# Patient Record
Sex: FEMALE | Race: WHITE | Employment: UNEMPLOYED | URBAN - NONMETROPOLITAN AREA
[De-identification: names, ages, dates, MRNs, and addresses within clinical notes are randomized per-mention and may not be internally consistent; named-entity substitution may affect disease eponyms.]

---

## 2023-04-12 PROBLEM — F33.1 MODERATE EPISODE OF RECURRENT MAJOR DEPRESSIVE DISORDER (HCC): Status: ACTIVE | Noted: 2023-04-12

## 2023-04-12 PROBLEM — F41.0 GENERALIZED ANXIETY DISORDER WITH PANIC ATTACKS: Status: ACTIVE | Noted: 2023-04-12

## 2023-04-12 PROBLEM — F41.1 GENERALIZED ANXIETY DISORDER WITH PANIC ATTACKS: Status: ACTIVE | Noted: 2023-04-12

## 2023-04-12 PROBLEM — K21.9 GASTROESOPHAGEAL REFLUX DISEASE: Status: ACTIVE | Noted: 2023-04-12

## 2023-05-29 DIAGNOSIS — F41.1 GENERALIZED ANXIETY DISORDER WITH PANIC ATTACKS: ICD-10-CM

## 2023-05-29 DIAGNOSIS — F41.0 GENERALIZED ANXIETY DISORDER WITH PANIC ATTACKS: ICD-10-CM

## 2023-05-29 DIAGNOSIS — F33.1 MODERATE EPISODE OF RECURRENT MAJOR DEPRESSIVE DISORDER (HCC): ICD-10-CM

## 2023-05-30 ENCOUNTER — OFFICE VISIT (OUTPATIENT)
Dept: FAMILY MEDICINE CLINIC | Age: 33
End: 2023-05-30
Payer: COMMERCIAL

## 2023-05-30 VITALS
DIASTOLIC BLOOD PRESSURE: 84 MMHG | WEIGHT: 139.8 LBS | RESPIRATION RATE: 14 BRPM | BODY MASS INDEX: 26.39 KG/M2 | SYSTOLIC BLOOD PRESSURE: 124 MMHG | TEMPERATURE: 98.4 F | HEIGHT: 61 IN | OXYGEN SATURATION: 99 % | HEART RATE: 72 BPM

## 2023-05-30 DIAGNOSIS — F41.0 GENERALIZED ANXIETY DISORDER WITH PANIC ATTACKS: ICD-10-CM

## 2023-05-30 DIAGNOSIS — F41.1 GENERALIZED ANXIETY DISORDER WITH PANIC ATTACKS: ICD-10-CM

## 2023-05-30 DIAGNOSIS — F33.2 SEVERE EPISODE OF RECURRENT MAJOR DEPRESSIVE DISORDER, WITHOUT PSYCHOTIC FEATURES (HCC): Primary | ICD-10-CM

## 2023-05-30 PROCEDURE — G8419 CALC BMI OUT NRM PARAM NOF/U: HCPCS | Performed by: NURSE PRACTITIONER

## 2023-05-30 PROCEDURE — G8427 DOCREV CUR MEDS BY ELIG CLIN: HCPCS | Performed by: NURSE PRACTITIONER

## 2023-05-30 PROCEDURE — 99214 OFFICE O/P EST MOD 30 MIN: CPT | Performed by: NURSE PRACTITIONER

## 2023-05-30 PROCEDURE — 1036F TOBACCO NON-USER: CPT | Performed by: NURSE PRACTITIONER

## 2023-05-30 RX ORDER — ESCITALOPRAM OXALATE 10 MG/1
10 TABLET ORAL DAILY
Qty: 30 TABLET | Refills: 1 | Status: SHIPPED | OUTPATIENT
Start: 2023-05-30

## 2023-05-30 RX ORDER — FLUOXETINE HYDROCHLORIDE 20 MG/1
CAPSULE ORAL
Qty: 30 CAPSULE | Refills: 1 | Status: SHIPPED | OUTPATIENT
Start: 2023-05-30 | End: 2023-05-30

## 2023-05-30 SDOH — ECONOMIC STABILITY: FOOD INSECURITY: WITHIN THE PAST 12 MONTHS, THE FOOD YOU BOUGHT JUST DIDN'T LAST AND YOU DIDN'T HAVE MONEY TO GET MORE.: NEVER TRUE

## 2023-05-30 SDOH — ECONOMIC STABILITY: INCOME INSECURITY: HOW HARD IS IT FOR YOU TO PAY FOR THE VERY BASICS LIKE FOOD, HOUSING, MEDICAL CARE, AND HEATING?: NOT HARD AT ALL

## 2023-05-30 SDOH — ECONOMIC STABILITY: HOUSING INSECURITY
IN THE LAST 12 MONTHS, WAS THERE A TIME WHEN YOU DID NOT HAVE A STEADY PLACE TO SLEEP OR SLEPT IN A SHELTER (INCLUDING NOW)?: NO

## 2023-05-30 SDOH — ECONOMIC STABILITY: FOOD INSECURITY: WITHIN THE PAST 12 MONTHS, YOU WORRIED THAT YOUR FOOD WOULD RUN OUT BEFORE YOU GOT MONEY TO BUY MORE.: NEVER TRUE

## 2023-05-30 NOTE — PROGRESS NOTES
[facility] at / [address] [facility] [address] [address]. [address]  Chief Complaint   Patient presents with    Follow-up     Stopped taking prozac due to giving headaches        History obtained from chart review and the patient. SUBJECTIVE:  Tony Mcghee is a 34 y.o. female that presents today for follow up anxiety/depression    Anxiety     HPI:    Inciting events or triggers for anxiety - kids, being around a lot of people   Frequency of anxiety - daily  Panic attacks? Yes  Symptoms of panic attacks -  feelings of losing control, psychomotor agitation, and sweating  Sleep Disturbances? Yes - staying asleep  Impaired concentration? No  Substance abuse? Some ETOH, maybe 2-3 times/week 2-3 beers at a setting  Suicidal/Homicidal Ideation? Yes - in the past had suicidal thoughts, but no plan or intent. Compliant with meds: yes, Prozac 20 mg  Med side effects: headaches and upset stomach- so she stopped the Prozac    Sees therapist?: No  Family History of Mental Illness? Yes    History of depression as well. She was Cymbalta in the past but it made her gain weight. OBGYN gave her Zoloft and Effexor in the past. Effexor didn't work, and Zoloft messed with her menses. Recently broke up with boyfriend and moods are terrible. Feeling very depressed. Sleeping terribly, can't turn mind off. At first she was struggling with SI after breakup, but not currently. Appetite has also been terrible. Energy and motivation are terrible.     Age/Gender Health Maintenance    Lipid - No results found for: CHOL  No results found for: TRIG  No results found for: HDL  No results found for: LDLCHOLESTEROL, LDLCALC  No results found for: LABVLDL, VLDL  No results found for: CHOLHDLRATIO  DM Screen - No results found for: LABA1C  Colon Cancer Screening - 39  Lung Cancer Screening (Age 54 to [de-identified] with 30 pack year hx, current smoker or quit within past 15 years) - n/a    Tetanus - next due 10/14/26  Influenza Vaccine -

## 2023-06-27 DIAGNOSIS — F41.0 GENERALIZED ANXIETY DISORDER WITH PANIC ATTACKS: ICD-10-CM

## 2023-06-27 DIAGNOSIS — F33.1 MODERATE EPISODE OF RECURRENT MAJOR DEPRESSIVE DISORDER (HCC): ICD-10-CM

## 2023-06-27 DIAGNOSIS — K21.9 GASTROESOPHAGEAL REFLUX DISEASE, UNSPECIFIED WHETHER ESOPHAGITIS PRESENT: ICD-10-CM

## 2023-06-27 DIAGNOSIS — F41.1 GENERALIZED ANXIETY DISORDER WITH PANIC ATTACKS: ICD-10-CM

## 2023-06-27 RX ORDER — OMEPRAZOLE 20 MG/1
CAPSULE, DELAYED RELEASE ORAL
Qty: 60 CAPSULE | Refills: 2 | Status: SHIPPED | OUTPATIENT
Start: 2023-06-27

## 2023-06-27 RX ORDER — TRAZODONE HYDROCHLORIDE 50 MG/1
TABLET ORAL
Qty: 30 TABLET | Refills: 2 | Status: SHIPPED | OUTPATIENT
Start: 2023-06-27

## 2023-07-25 DIAGNOSIS — F41.0 GENERALIZED ANXIETY DISORDER WITH PANIC ATTACKS: ICD-10-CM

## 2023-07-25 DIAGNOSIS — F33.1 MODERATE EPISODE OF RECURRENT MAJOR DEPRESSIVE DISORDER (HCC): ICD-10-CM

## 2023-07-25 DIAGNOSIS — F41.1 GENERALIZED ANXIETY DISORDER WITH PANIC ATTACKS: ICD-10-CM

## 2023-07-25 DIAGNOSIS — F33.2 SEVERE EPISODE OF RECURRENT MAJOR DEPRESSIVE DISORDER, WITHOUT PSYCHOTIC FEATURES (HCC): ICD-10-CM

## 2023-07-25 RX ORDER — ESCITALOPRAM OXALATE 10 MG/1
TABLET ORAL
Qty: 30 TABLET | Refills: 0 | Status: SHIPPED | OUTPATIENT
Start: 2023-07-25 | End: 2023-08-21

## 2023-07-25 RX ORDER — FLUOXETINE HYDROCHLORIDE 20 MG/1
CAPSULE ORAL
Qty: 30 CAPSULE | Refills: 1 | OUTPATIENT
Start: 2023-07-25

## 2023-07-25 NOTE — TELEPHONE ENCOUNTER
Recent Visits  Date Type Provider Dept   05/30/23 Office Visit SHIRA Benavides CNP [facility]   04/12/23 Office Visit SHIRA Benavides CNP [facility]   Showing recent visits within past 540 days with a meds authorizing provider and meeting all other requirements  Future Appointments  Date Type Provider Dept   07/27/23 Appointment SHIRA Benavides CNP [facility]   Showing future appointments within next 150 days with a meds authorizing provider and meeting all other requirements

## 2023-08-21 ENCOUNTER — OFFICE VISIT (OUTPATIENT)
Dept: FAMILY MEDICINE CLINIC | Age: 33
End: 2023-08-21
Payer: COMMERCIAL

## 2023-08-21 VITALS
WEIGHT: 137.8 LBS | HEIGHT: 61 IN | HEART RATE: 92 BPM | DIASTOLIC BLOOD PRESSURE: 80 MMHG | BODY MASS INDEX: 26.01 KG/M2 | TEMPERATURE: 97.3 F | OXYGEN SATURATION: 98 % | RESPIRATION RATE: 16 BRPM | SYSTOLIC BLOOD PRESSURE: 116 MMHG

## 2023-08-21 DIAGNOSIS — F33.42 RECURRENT MAJOR DEPRESSIVE DISORDER, IN FULL REMISSION (HCC): ICD-10-CM

## 2023-08-21 DIAGNOSIS — F41.0 GENERALIZED ANXIETY DISORDER WITH PANIC ATTACKS: ICD-10-CM

## 2023-08-21 DIAGNOSIS — R61 GENERALIZED HYPERHIDROSIS: Primary | ICD-10-CM

## 2023-08-21 DIAGNOSIS — F41.1 GENERALIZED ANXIETY DISORDER WITH PANIC ATTACKS: ICD-10-CM

## 2023-08-21 PROCEDURE — 1036F TOBACCO NON-USER: CPT | Performed by: NURSE PRACTITIONER

## 2023-08-21 PROCEDURE — 99214 OFFICE O/P EST MOD 30 MIN: CPT | Performed by: NURSE PRACTITIONER

## 2023-08-21 PROCEDURE — G8427 DOCREV CUR MEDS BY ELIG CLIN: HCPCS | Performed by: NURSE PRACTITIONER

## 2023-08-21 PROCEDURE — G8419 CALC BMI OUT NRM PARAM NOF/U: HCPCS | Performed by: NURSE PRACTITIONER

## 2023-08-21 RX ORDER — ESCITALOPRAM OXALATE 10 MG/1
15 TABLET ORAL DAILY
Qty: 45 TABLET | Refills: 2 | Status: SHIPPED | OUTPATIENT
Start: 2023-08-21

## 2023-08-21 RX ORDER — GLYCOPYRROLATE 1 MG/1
1 TABLET ORAL 2 TIMES DAILY
Qty: 60 TABLET | Refills: 1 | Status: SHIPPED | OUTPATIENT
Start: 2023-08-21

## 2023-08-21 NOTE — PROGRESS NOTES
[facility] at [address], [address] [place] [address]  Chief Complaint   Patient presents with    Depression     States anxiety medicine is helping, will need another refill. Acid reflux medication is helping. Would like to discuss her excessive sweating. History obtained from chart review and the patient.     SUBJECTIVE:  Juan Benítez is a 34 y.o. female that presents today for follow up anxiety/depression    Anxiety/MDD  She denies any depression  Feels like Lexapro helps with depression  Anxiety is better, still shakes a lot when around people  Denies any side effects  Denies any panic attacks  Would like to maybe bump dose up to help more with anxiety    Hyperhidrosis  C/o generalized hyperhidrosis  Has had for years but gradually getting worse    Age/Gender Health Maintenance    Lipid - No results found for: CHOL  No results found for: TRIG  No results found for: HDL  No results found for: LDLCHOLESTEROL, LDLCALC  No results found for: LABVLDL, VLDL  No results found for: CHOLHDLRATIO  DM Screen - No results found for: LABA1C  Colon Cancer Screening - 39  Lung Cancer Screening (Age 54 to 80 with 30 pack year hx, current smoker or quit within past 15 years) - n/a    Tetanus - next due 10/14/26  Influenza Vaccine - yearly  Pneumonia Vaccine - 65  Zostavax - 50   HPV Vaccine - n/a    Breast Cancer Screening - 50  Cervical Cancer Screening - per OB  Osteoporosis Screening - 65  Chlamydia Screen - n/    AAA Screening - n/a    Falls screening - n/a    Current Outpatient Medications   Medication Sig Dispense Refill    escitalopram (LEXAPRO) 10 MG tablet Take 1.5 tablets by mouth daily 45 tablet 2    glycopyrrolate (ROBINUL) 1 MG tablet Take 1 tablet by mouth 2 times daily 60 tablet 1    omeprazole (PRILOSEC) 20 MG delayed release capsule take 1 capsule by mouth twice a day before meals 60 capsule 2    traZODone (DESYREL) 50 MG tablet take 1/2 to 1 tablet by mouth nightly if needed for sleep

## 2023-09-18 DIAGNOSIS — F41.1 GENERALIZED ANXIETY DISORDER WITH PANIC ATTACKS: ICD-10-CM

## 2023-09-18 DIAGNOSIS — F41.0 GENERALIZED ANXIETY DISORDER WITH PANIC ATTACKS: ICD-10-CM

## 2023-09-18 DIAGNOSIS — F33.1 MODERATE EPISODE OF RECURRENT MAJOR DEPRESSIVE DISORDER (HCC): ICD-10-CM

## 2023-09-18 RX ORDER — TRAZODONE HYDROCHLORIDE 50 MG/1
TABLET ORAL
Qty: 90 TABLET | Refills: 1 | Status: SHIPPED | OUTPATIENT
Start: 2023-09-18

## 2023-09-18 NOTE — TELEPHONE ENCOUNTER
Recent Visits  Date Type Provider Dept   08/21/23 Office Visit SH[facility]A Stanley CNP [facility]   05/30/23 Office Visit SHIRA Stanley [facility]   04/12/23 Office Visit SHIRA Stanley [facility]   Showing recent visits within past 540 days with a meds authorizing provider and meeting all other requirements  Future Appointments  Date Type Provider Dept   10/23/23 Appointment SHIRA Stanley C[facility]   Showing future appointments within next 150 days with a meds authorizing provider and meeting all other requirements

## 2023-09-26 DIAGNOSIS — K21.9 GASTROESOPHAGEAL REFLUX DISEASE, UNSPECIFIED WHETHER ESOPHAGITIS PRESENT: ICD-10-CM

## 2023-09-26 RX ORDER — OMEPRAZOLE 20 MG/1
CAPSULE, DELAYED RELEASE ORAL
Qty: 60 CAPSULE | Refills: 2 | Status: SHIPPED | OUTPATIENT
Start: 2023-09-26

## 2023-09-26 NOTE — TELEPHONE ENCOUNTER
Recent Visits  Date Type Provider Dept   08/21/23 Office Visit [facility]er [facility]   05/30/23 Office Visit SH[facility]A Greer [facility]   04/12/23 Office Visit SH[facility]A Greer [facility]   Showing recent visits within past 540 days with a meds authorizing provider and meeting all other requirements  Future Appointments  Date Type Provider Dept   10/23/23 Appointment SHIRA Greer [facility]   Showing future appointments within next 150 days with a meds authorizing provider and meeting all other requirements

## 2023-10-14 DIAGNOSIS — R61 GENERALIZED HYPERHIDROSIS: ICD-10-CM

## 2023-10-16 RX ORDER — GLYCOPYRROLATE 1 MG/1
1 TABLET ORAL 2 TIMES DAILY
Qty: 60 TABLET | Refills: 1 | Status: SHIPPED | OUTPATIENT
Start: 2023-10-16

## 2023-10-16 NOTE — TELEPHONE ENCOUNTER
Recent Visits  Date Type Provider Dept   08/21/23 Office Visit SH[facility]A Jackson [facility]   05/30/23 Office Visit SHIRA Jackson [facility]   04/12/23 Office Visit SHIRA Jackson [facility]   Showing recent visits within past 540 days with a meds authorizing provider and meeting all other requirements  Future Appointments  Date Type Provider Dept   10/23/23 Appointment SHIRA Jackson [facility]   Showing future appointments within next 150 days with a meds authorizing provider and meeting all other requirements

## 2023-12-05 DIAGNOSIS — F41.0 GENERALIZED ANXIETY DISORDER WITH PANIC ATTACKS: ICD-10-CM

## 2023-12-05 DIAGNOSIS — F33.42 RECURRENT MAJOR DEPRESSIVE DISORDER, IN FULL REMISSION (HCC): ICD-10-CM

## 2023-12-05 DIAGNOSIS — F41.1 GENERALIZED ANXIETY DISORDER WITH PANIC ATTACKS: ICD-10-CM

## 2023-12-05 RX ORDER — ESCITALOPRAM OXALATE 10 MG/1
15 TABLET ORAL DAILY
Qty: 45 TABLET | Refills: 2 | Status: SHIPPED | OUTPATIENT
Start: 2023-12-05

## 2023-12-05 NOTE — TELEPHONE ENCOUNTER
Recent Visits  Date Type Provider Dept   08/21/23 Office Visit Clearance SHIRA Haynes [facility]NP [facility]   05/30/23 Office Visit Clearance SH[facility]A Haynes [facility]   04/12/23 Office Visit Clearance SH[facility]A Haynes [facility]   Showing recent visits within past 540 days with a meds authorizing provider and meeting all other requirements  Future Appointments  Date Type Provider Dept   12/11/23 Appointment Clearance SHIRA Haynes [facility]   Showing future appointments within next 150 days with a meds authorizing provider and meeting all other requirements

## 2023-12-06 DIAGNOSIS — F41.1 GENERALIZED ANXIETY DISORDER WITH PANIC ATTACKS: ICD-10-CM

## 2023-12-06 DIAGNOSIS — F33.42 RECURRENT MAJOR DEPRESSIVE DISORDER, IN FULL REMISSION (HCC): ICD-10-CM

## 2023-12-06 DIAGNOSIS — F41.0 GENERALIZED ANXIETY DISORDER WITH PANIC ATTACKS: ICD-10-CM

## 2023-12-06 RX ORDER — ESCITALOPRAM OXALATE 10 MG/1
10 TABLET ORAL DAILY
Qty: 30 TABLET | OUTPATIENT
Start: 2023-12-06

## 2023-12-09 DIAGNOSIS — R61 GENERALIZED HYPERHIDROSIS: ICD-10-CM

## 2023-12-11 RX ORDER — GLYCOPYRROLATE 1 MG/1
1 TABLET ORAL 2 TIMES DAILY
Qty: 60 TABLET | Refills: 1 | Status: SHIPPED | OUTPATIENT
Start: 2023-12-11

## 2023-12-11 NOTE — TELEPHONE ENCOUNTER
Recent Visits  Date Type Provider Dept   23 Office Visit [facility]A Palomares [facility]   23 Office Visit [facility] Palomares [facility]   23 Office Visit [facility]A Palomares [facility]   Showing recent visits within past 540 days with a meds authorizing provider and meeting all other requirements  Today's Visits  Date Type Provider Dept   23 Appointment SHIRA Palomares [facility]   Showing today's visits with a meds authorizing provider and meeting all other requirements  Future Appointments  No visits were found meeting these conditions.   Showing future appointments within next 150 days with a meds authorizing provider and meeting all other requirements

## 2023-12-12 ENCOUNTER — TELEMEDICINE (OUTPATIENT)
Dept: FAMILY MEDICINE CLINIC | Age: 33
End: 2023-12-12
Payer: COMMERCIAL

## 2023-12-12 DIAGNOSIS — F41.0 GENERALIZED ANXIETY DISORDER WITH PANIC ATTACKS: ICD-10-CM

## 2023-12-12 DIAGNOSIS — R61 GENERALIZED HYPERHIDROSIS: ICD-10-CM

## 2023-12-12 DIAGNOSIS — F33.1 MODERATE EPISODE OF RECURRENT MAJOR DEPRESSIVE DISORDER (HCC): Primary | ICD-10-CM

## 2023-12-12 DIAGNOSIS — F41.1 GENERALIZED ANXIETY DISORDER WITH PANIC ATTACKS: ICD-10-CM

## 2023-12-12 DIAGNOSIS — K21.9 GASTROESOPHAGEAL REFLUX DISEASE, UNSPECIFIED WHETHER ESOPHAGITIS PRESENT: ICD-10-CM

## 2023-12-12 PROCEDURE — G8428 CUR MEDS NOT DOCUMENT: HCPCS | Performed by: NURSE PRACTITIONER

## 2023-12-12 PROCEDURE — G8484 FLU IMMUNIZE NO ADMIN: HCPCS | Performed by: NURSE PRACTITIONER

## 2023-12-12 PROCEDURE — 1036F TOBACCO NON-USER: CPT | Performed by: NURSE PRACTITIONER

## 2023-12-12 PROCEDURE — 99214 OFFICE O/P EST MOD 30 MIN: CPT | Performed by: NURSE PRACTITIONER

## 2023-12-12 PROCEDURE — G8419 CALC BMI OUT NRM PARAM NOF/U: HCPCS | Performed by: NURSE PRACTITIONER

## 2023-12-12 RX ORDER — ESCITALOPRAM OXALATE 20 MG/1
20 TABLET ORAL DAILY
Qty: 30 TABLET | Refills: 1 | Status: SHIPPED | OUTPATIENT
Start: 2023-12-12

## 2023-12-12 ASSESSMENT — ENCOUNTER SYMPTOMS
EYE PAIN: 0
EYE REDNESS: 0
VOMITING: 0
NAUSEA: 1
RHINORRHEA: 0
SORE THROAT: 0
WHEEZING: 0
COUGH: 0
DIARRHEA: 0
TROUBLE SWALLOWING: 0
ABDOMINAL PAIN: 0
COLOR CHANGE: 0
SHORTNESS OF BREATH: 0

## 2023-12-12 NOTE — PROGRESS NOTES
Completed    HIV screen  Completed    Hepatitis A vaccine  Aged Out    HPV vaccine  Aged Out    Meningococcal (ACWY) vaccine  Aged Out    Pneumococcal 0-64 years Vaccine  Aged Out    Depression Screen  Discontinued       PHYSICAL EXAMINATION:  [ INSTRUCTIONS:  \"[x]\" Indicates a positive item  \"[]\" Indicates a negative item  -- DELETE ALL ITEMS NOT EXAMINED]  Vital Signs: (As obtained by patient/caregiver or practitioner observation)    Blood pressure-  Heart rate-    Respiratory rate-    Temperature-  Pulse oximetry-     Constitutional: [x] Appears well-developed and well-nourished [x] No apparent distress      [] Abnormal-   Mental status  [x] Alert and awake  [x] Oriented to person/place/time [x]Able to follow commands      Eyes:  EOM    [x]  Normal  [] Abnormal-  Sclera  [x]  Normal  [] Abnormal -         Discharge [x]  None visible  [] Abnormal -    HENT:   [x] Normocephalic, atraumatic.   [] Abnormal   [x] Mouth/Throat: Mucous membranes are moist.     External Ears [x] Normal  [] Abnormal-     Neck: [x] No visualized mass     Pulmonary/Chest: [x] Respiratory effort normal.  [x] No visualized signs of difficulty breathing or respiratory distress        [] Abnormal-      Musculoskeletal:   [x] Normal gait with no signs of ataxia         [x] Normal range of motion of neck        [] Abnormal-       Neurological:        [x] No Facial Asymmetry (Cranial nerve 7 motor function) (limited exam to video visit)          [x] No gaze palsy        [] Abnormal-         Skin:        [x] No significant exanthematous lesions or discoloration noted on facial skin         [] Abnormal-            Psychiatric:       [x] Normal Affect [x] No Hallucinations        [] Abnormal-     Other pertinent observable physical exam findings-     ASSESSMENT & PLAN  Diagnoses and all orders for this visit:    Moderate episode of recurrent major depressive disorder (HCC)    Generalized anxiety disorder with panic attacks  -     escitalopram

## 2023-12-27 DIAGNOSIS — K21.9 GASTROESOPHAGEAL REFLUX DISEASE, UNSPECIFIED WHETHER ESOPHAGITIS PRESENT: ICD-10-CM

## 2023-12-27 RX ORDER — OMEPRAZOLE 20 MG/1
CAPSULE, DELAYED RELEASE ORAL
Qty: 60 CAPSULE | Refills: 2 | Status: SHIPPED | OUTPATIENT
Start: 2023-12-27

## 2023-12-27 NOTE — TELEPHONE ENCOUNTER
Recent Visits  Date Type Provider Dept   08/21/23 Office Visit [facility]A Slaughter C[facility]   05/30/23 Office Visit SH[facility]A Slaughter [facility]   04/12/23 Office Visit HSIRA Slaughter C[facility]   Showing recent visits within past 540 days with a meds authorizing provider and meeting all other requirements  Future Appointments  Date Type Provider Dept   01/09/24 Appointment SH[facility]A Slaughter [facility]   Showing future appointments within next 150 days with a meds authorizing provider and meeting all other requirements

## 2024-01-24 ENCOUNTER — HOSPITAL ENCOUNTER (INPATIENT)
Age: 34
LOS: 7 days | Discharge: HOME | DRG: 263 | End: 2024-02-02
Attending: EMERGENCY MEDICINE | Admitting: NURSE PRACTITIONER
Payer: COMMERCIAL

## 2024-01-24 ENCOUNTER — APPOINTMENT (OUTPATIENT)
Dept: ULTRASOUND IMAGING | Age: 34
DRG: 263 | End: 2024-01-24
Payer: COMMERCIAL

## 2024-01-24 ENCOUNTER — APPOINTMENT (OUTPATIENT)
Dept: CT IMAGING | Age: 34
DRG: 263 | End: 2024-01-24
Payer: COMMERCIAL

## 2024-01-24 DIAGNOSIS — N39.0 URINARY TRACT INFECTION WITHOUT HEMATURIA, SITE UNSPECIFIED: ICD-10-CM

## 2024-01-24 DIAGNOSIS — K29.20 ACUTE ALCOHOLIC GASTRITIS WITHOUT HEMORRHAGE: ICD-10-CM

## 2024-01-24 DIAGNOSIS — K80.80 BILIARY CALCULUS OF OTHER SITE WITHOUT OBSTRUCTION: ICD-10-CM

## 2024-01-24 DIAGNOSIS — R07.9 CHEST PAIN IN ADULT: ICD-10-CM

## 2024-01-24 DIAGNOSIS — K81.0 ACUTE CHOLECYSTITIS: ICD-10-CM

## 2024-01-24 DIAGNOSIS — Z90.49 S/P LAPAROSCOPIC CHOLECYSTECTOMY: ICD-10-CM

## 2024-01-24 DIAGNOSIS — F10.20 ALCOHOLISM (HCC): Primary | ICD-10-CM

## 2024-01-24 LAB
ALBUMIN SERPL BCG-MCNC: 4.1 G/DL (ref 3.5–5.1)
ALP SERPL-CCNC: 121 U/L (ref 38–126)
ALT SERPL W/O P-5'-P-CCNC: 109 U/L (ref 11–66)
AMPHETAMINES UR QL SCN: NEGATIVE
ANION GAP SERPL CALC-SCNC: 14 MEQ/L (ref 8–16)
AST SERPL-CCNC: 74 U/L (ref 5–40)
B-HCG SERPL QL: NEGATIVE
BACTERIA URNS QL MICRO: ABNORMAL /HPF
BARBITURATES UR QL SCN: NEGATIVE
BASOPHILS ABSOLUTE: 0 THOU/MM3 (ref 0–0.1)
BASOPHILS NFR BLD AUTO: 0.4 %
BENZODIAZ UR QL SCN: NEGATIVE
BILIRUB CONJ SERPL-MCNC: < 0.2 MG/DL (ref 0–0.3)
BILIRUB SERPL-MCNC: 0.2 MG/DL (ref 0.3–1.2)
BILIRUB UR QL STRIP.AUTO: NEGATIVE
BUN SERPL-MCNC: 9 MG/DL (ref 7–22)
BZE UR QL SCN: NEGATIVE
CALCIUM SERPL-MCNC: 8.4 MG/DL (ref 8.5–10.5)
CANNABINOIDS UR QL SCN: NEGATIVE
CASTS #/AREA URNS LPF: ABNORMAL /LPF
CASTS 2: ABNORMAL /LPF
CHARACTER UR: ABNORMAL
CHLORIDE SERPL-SCNC: 101 MEQ/L (ref 98–111)
CO2 SERPL-SCNC: 23 MEQ/L (ref 23–33)
COLOR: YELLOW
CREAT SERPL-MCNC: 0.5 MG/DL (ref 0.4–1.2)
CRYSTALS URNS MICRO: ABNORMAL
D DIMER PPP IA.FEU-MCNC: 623 NG/ML FEU (ref 0–500)
DEPRECATED RDW RBC AUTO: 50.9 FL (ref 35–45)
EKG ATRIAL RATE: 76 BPM
EKG P AXIS: 48 DEGREES
EKG P-R INTERVAL: 130 MS
EKG Q-T INTERVAL: 420 MS
EKG QRS DURATION: 82 MS
EKG QTC CALCULATION (BAZETT): 472 MS
EKG R AXIS: 56 DEGREES
EKG T AXIS: 27 DEGREES
EKG VENTRICULAR RATE: 76 BPM
EOSINOPHIL NFR BLD AUTO: 1.1 %
EOSINOPHILS ABSOLUTE: 0.1 THOU/MM3 (ref 0–0.4)
EPITHELIAL CELLS, UA: ABNORMAL /HPF
ERYTHROCYTE [DISTWIDTH] IN BLOOD BY AUTOMATED COUNT: 14.8 % (ref 11.5–14.5)
ETHANOL SERPL-MCNC: < 0.01 %
FENTANYL: NEGATIVE
FERRITIN SERPL IA-MCNC: 22 NG/ML (ref 10–291)
GFR SERPL CREATININE-BSD FRML MDRD: > 60 ML/MIN/1.73M2
GLUCOSE SERPL-MCNC: 81 MG/DL (ref 70–108)
GLUCOSE UR QL STRIP.AUTO: NEGATIVE MG/DL
HAV IGM SER QL: NEGATIVE
HBV CORE IGM SERPL QL IA: NEGATIVE
HBV SURFACE AG SERPL QL IA: NEGATIVE
HCT VFR BLD AUTO: 34.8 % (ref 37–47)
HCV IGG SERPL QL IA: NEGATIVE
HETEROPH AB SERPL QL IA: NEGATIVE
HGB BLD-MCNC: 11.5 GM/DL (ref 12–16)
HGB UR QL STRIP.AUTO: ABNORMAL
IMM GRANULOCYTES # BLD AUTO: 0.02 THOU/MM3 (ref 0–0.07)
IMM GRANULOCYTES NFR BLD AUTO: 0.4 %
IRON SATN MFR SERPL: 24 % (ref 20–50)
IRON SERPL-MCNC: 66 UG/DL (ref 50–170)
KETONES UR QL STRIP.AUTO: NEGATIVE
LIPASE SERPL-CCNC: 13.2 U/L (ref 5.6–51.3)
LYMPHOCYTES ABSOLUTE: 1.9 THOU/MM3 (ref 1–4.8)
LYMPHOCYTES NFR BLD AUTO: 34.3 %
MAGNESIUM SERPL-MCNC: 2 MG/DL (ref 1.6–2.4)
MCH RBC QN AUTO: 30.7 PG (ref 26–33)
MCHC RBC AUTO-ENTMCNC: 33 GM/DL (ref 32.2–35.5)
MCV RBC AUTO: 93 FL (ref 81–99)
MISCELLANEOUS 2: ABNORMAL
MONOCYTES ABSOLUTE: 0.4 THOU/MM3 (ref 0.4–1.3)
MONOCYTES NFR BLD AUTO: 7.3 %
NEUTROPHILS NFR BLD AUTO: 56.5 %
NITRITE UR QL STRIP: POSITIVE
NRBC BLD AUTO-RTO: 0 /100 WBC
OPIATES UR QL SCN: NEGATIVE
OSMOLALITY SERPL CALC.SUM OF ELEC: 273.4 MOSMOL/KG (ref 275–300)
OXYCODONE: NEGATIVE
PCP UR QL SCN: NEGATIVE
PH UR STRIP.AUTO: 6 [PH] (ref 5–9)
PLATELET # BLD AUTO: 153 THOU/MM3 (ref 130–400)
PMV BLD AUTO: 10.4 FL (ref 9.4–12.4)
POTASSIUM SERPL-SCNC: 3.9 MEQ/L (ref 3.5–5.2)
PROT SERPL-MCNC: 7.1 G/DL (ref 6.1–8)
PROT UR STRIP.AUTO-MCNC: ABNORMAL MG/DL
RBC # BLD AUTO: 3.74 MILL/MM3 (ref 4.2–5.4)
RBC URINE: ABNORMAL /HPF
RENAL EPI CELLS #/AREA URNS HPF: ABNORMAL /[HPF]
SEGMENTED NEUTROPHILS ABSOLUTE COUNT: 3.2 THOU/MM3 (ref 1.8–7.7)
SODIUM SERPL-SCNC: 138 MEQ/L (ref 135–145)
SP GR UR REFRACT.AUTO: 1.02 (ref 1–1.03)
TIBC SERPL-MCNC: 271 UG/DL (ref 171–450)
TROPONIN, HIGH SENSITIVITY: < 6 NG/L (ref 0–12)
TROPONIN, HIGH SENSITIVITY: < 6 NG/L (ref 0–12)
TSH SERPL DL<=0.005 MIU/L-ACNC: 1.29 UIU/ML (ref 0.4–4.2)
UROBILINOGEN, URINE: 0.2 EU/DL (ref 0–1)
WBC # BLD AUTO: 5.6 THOU/MM3 (ref 4.8–10.8)
WBC #/AREA URNS HPF: ABNORMAL /HPF
WBC #/AREA URNS HPF: ABNORMAL /[HPF]
YEAST LIKE FUNGI URNS QL MICRO: ABNORMAL

## 2024-01-24 PROCEDURE — 83690 ASSAY OF LIPASE: CPT

## 2024-01-24 PROCEDURE — 85379 FIBRIN DEGRADATION QUANT: CPT

## 2024-01-24 PROCEDURE — 87086 URINE CULTURE/COLONY COUNT: CPT

## 2024-01-24 PROCEDURE — 99223 1ST HOSP IP/OBS HIGH 75: CPT

## 2024-01-24 PROCEDURE — 82077 ASSAY SPEC XCP UR&BREATH IA: CPT

## 2024-01-24 PROCEDURE — 84484 ASSAY OF TROPONIN QUANT: CPT

## 2024-01-24 PROCEDURE — 83735 ASSAY OF MAGNESIUM: CPT

## 2024-01-24 PROCEDURE — 87077 CULTURE AEROBIC IDENTIFY: CPT

## 2024-01-24 PROCEDURE — 93005 ELECTROCARDIOGRAM TRACING: CPT | Performed by: EMERGENCY MEDICINE

## 2024-01-24 PROCEDURE — 86308 HETEROPHILE ANTIBODY SCREEN: CPT

## 2024-01-24 PROCEDURE — 6370000000 HC RX 637 (ALT 250 FOR IP)

## 2024-01-24 PROCEDURE — 83540 ASSAY OF IRON: CPT

## 2024-01-24 PROCEDURE — 87186 SC STD MICRODIL/AGAR DIL: CPT

## 2024-01-24 PROCEDURE — 82728 ASSAY OF FERRITIN: CPT

## 2024-01-24 PROCEDURE — 71275 CT ANGIOGRAPHY CHEST: CPT

## 2024-01-24 PROCEDURE — 80074 ACUTE HEPATITIS PANEL: CPT

## 2024-01-24 PROCEDURE — 81001 URINALYSIS AUTO W/SCOPE: CPT

## 2024-01-24 PROCEDURE — 36415 COLL VENOUS BLD VENIPUNCTURE: CPT

## 2024-01-24 PROCEDURE — 84703 CHORIONIC GONADOTROPIN ASSAY: CPT

## 2024-01-24 PROCEDURE — 85025 COMPLETE CBC W/AUTO DIFF WBC: CPT

## 2024-01-24 PROCEDURE — 2580000003 HC RX 258: Performed by: EMERGENCY MEDICINE

## 2024-01-24 PROCEDURE — 6360000004 HC RX CONTRAST MEDICATION: Performed by: EMERGENCY MEDICINE

## 2024-01-24 PROCEDURE — 82248 BILIRUBIN DIRECT: CPT

## 2024-01-24 PROCEDURE — 93005 ELECTROCARDIOGRAM TRACING: CPT

## 2024-01-24 PROCEDURE — G0378 HOSPITAL OBSERVATION PER HR: HCPCS

## 2024-01-24 PROCEDURE — 6360000002 HC RX W HCPCS

## 2024-01-24 PROCEDURE — 83550 IRON BINDING TEST: CPT

## 2024-01-24 PROCEDURE — 93010 ELECTROCARDIOGRAM REPORT: CPT | Performed by: INTERNAL MEDICINE

## 2024-01-24 PROCEDURE — 96372 THER/PROPH/DIAG INJ SC/IM: CPT

## 2024-01-24 PROCEDURE — 99285 EMERGENCY DEPT VISIT HI MDM: CPT

## 2024-01-24 PROCEDURE — 96367 TX/PROPH/DG ADDL SEQ IV INF: CPT

## 2024-01-24 PROCEDURE — 2580000003 HC RX 258

## 2024-01-24 PROCEDURE — 6360000002 HC RX W HCPCS: Performed by: EMERGENCY MEDICINE

## 2024-01-24 PROCEDURE — 96365 THER/PROPH/DIAG IV INF INIT: CPT

## 2024-01-24 PROCEDURE — 80307 DRUG TEST PRSMV CHEM ANLYZR: CPT

## 2024-01-24 PROCEDURE — 84443 ASSAY THYROID STIM HORMONE: CPT

## 2024-01-24 PROCEDURE — 76705 ECHO EXAM OF ABDOMEN: CPT

## 2024-01-24 PROCEDURE — 80053 COMPREHEN METABOLIC PANEL: CPT

## 2024-01-24 RX ORDER — ONDANSETRON 2 MG/ML
4 INJECTION INTRAMUSCULAR; INTRAVENOUS EVERY 6 HOURS PRN
Status: DISCONTINUED | OUTPATIENT
Start: 2024-01-24 | End: 2024-02-02 | Stop reason: HOSPADM

## 2024-01-24 RX ORDER — SODIUM CHLORIDE 0.9 % (FLUSH) 0.9 %
10 SYRINGE (ML) INJECTION EVERY 12 HOURS SCHEDULED
Status: DISCONTINUED | OUTPATIENT
Start: 2024-01-24 | End: 2024-01-24 | Stop reason: SDUPTHER

## 2024-01-24 RX ORDER — SODIUM CHLORIDE 0.9 % (FLUSH) 0.9 %
10 SYRINGE (ML) INJECTION PRN
Status: DISCONTINUED | OUTPATIENT
Start: 2024-01-24 | End: 2024-01-24 | Stop reason: SDUPTHER

## 2024-01-24 RX ORDER — PHENOBARBITAL 32.4 MG/1
64.8 TABLET ORAL 2 TIMES DAILY
Status: COMPLETED | OUTPATIENT
Start: 2024-01-25 | End: 2024-01-25

## 2024-01-24 RX ORDER — ONDANSETRON 4 MG/1
4 TABLET, ORALLY DISINTEGRATING ORAL EVERY 8 HOURS PRN
Status: DISCONTINUED | OUTPATIENT
Start: 2024-01-24 | End: 2024-02-02 | Stop reason: HOSPADM

## 2024-01-24 RX ORDER — ESCITALOPRAM OXALATE 20 MG/1
20 TABLET ORAL DAILY
Status: DISCONTINUED | OUTPATIENT
Start: 2024-01-24 | End: 2024-02-02 | Stop reason: HOSPADM

## 2024-01-24 RX ORDER — SODIUM CHLORIDE 9 MG/ML
INJECTION, SOLUTION INTRAVENOUS PRN
Status: DISCONTINUED | OUTPATIENT
Start: 2024-01-24 | End: 2024-02-02 | Stop reason: HOSPADM

## 2024-01-24 RX ORDER — SODIUM CHLORIDE 9 MG/ML
INJECTION, SOLUTION INTRAVENOUS CONTINUOUS
Status: DISCONTINUED | OUTPATIENT
Start: 2024-01-24 | End: 2024-01-24

## 2024-01-24 RX ORDER — ACETAMINOPHEN 325 MG/1
650 TABLET ORAL EVERY 6 HOURS PRN
Status: DISCONTINUED | OUTPATIENT
Start: 2024-01-24 | End: 2024-01-30 | Stop reason: SDUPTHER

## 2024-01-24 RX ORDER — SODIUM CHLORIDE 9 MG/ML
INJECTION, SOLUTION INTRAVENOUS CONTINUOUS
Status: ACTIVE | OUTPATIENT
Start: 2024-01-24 | End: 2024-01-25

## 2024-01-24 RX ORDER — PHENOBARBITAL 32.4 MG/1
32.4 TABLET ORAL 2 TIMES DAILY
Status: DISPENSED | OUTPATIENT
Start: 2024-01-26 | End: 2024-01-27

## 2024-01-24 RX ORDER — FOLIC ACID 1 MG/1
1 TABLET ORAL DAILY
Status: DISCONTINUED | OUTPATIENT
Start: 2024-01-24 | End: 2024-02-02 | Stop reason: HOSPADM

## 2024-01-24 RX ORDER — ACETAMINOPHEN 650 MG/1
650 SUPPOSITORY RECTAL EVERY 6 HOURS PRN
Status: DISCONTINUED | OUTPATIENT
Start: 2024-01-24 | End: 2024-02-02 | Stop reason: HOSPADM

## 2024-01-24 RX ORDER — LANOLIN ALCOHOL/MO/W.PET/CERES
100 CREAM (GRAM) TOPICAL DAILY
Status: DISCONTINUED | OUTPATIENT
Start: 2024-01-24 | End: 2024-02-02 | Stop reason: HOSPADM

## 2024-01-24 RX ORDER — MULTIVITAMIN WITH IRON
1 TABLET ORAL DAILY
Status: DISCONTINUED | OUTPATIENT
Start: 2024-01-24 | End: 2024-02-02 | Stop reason: HOSPADM

## 2024-01-24 RX ORDER — SODIUM CHLORIDE 0.9 % (FLUSH) 0.9 %
5-40 SYRINGE (ML) INJECTION PRN
Status: DISCONTINUED | OUTPATIENT
Start: 2024-01-24 | End: 2024-02-02 | Stop reason: HOSPADM

## 2024-01-24 RX ORDER — TRAZODONE HYDROCHLORIDE 50 MG/1
50 TABLET ORAL NIGHTLY PRN
Status: DISCONTINUED | OUTPATIENT
Start: 2024-01-24 | End: 2024-02-02 | Stop reason: HOSPADM

## 2024-01-24 RX ORDER — POTASSIUM CHLORIDE 7.45 MG/ML
10 INJECTION INTRAVENOUS PRN
Status: DISCONTINUED | OUTPATIENT
Start: 2024-01-24 | End: 2024-02-02 | Stop reason: HOSPADM

## 2024-01-24 RX ORDER — MAGNESIUM SULFATE IN WATER 40 MG/ML
2000 INJECTION, SOLUTION INTRAVENOUS PRN
Status: DISCONTINUED | OUTPATIENT
Start: 2024-01-24 | End: 2024-02-02 | Stop reason: HOSPADM

## 2024-01-24 RX ORDER — SODIUM CHLORIDE 0.9 % (FLUSH) 0.9 %
5-40 SYRINGE (ML) INJECTION EVERY 12 HOURS SCHEDULED
Status: DISCONTINUED | OUTPATIENT
Start: 2024-01-24 | End: 2024-02-02 | Stop reason: HOSPADM

## 2024-01-24 RX ORDER — PANTOPRAZOLE SODIUM 40 MG/1
40 TABLET, DELAYED RELEASE ORAL
Status: DISCONTINUED | OUTPATIENT
Start: 2024-01-25 | End: 2024-02-02 | Stop reason: HOSPADM

## 2024-01-24 RX ORDER — ACETAMINOPHEN 325 MG/1
650 TABLET ORAL EVERY 6 HOURS PRN
COMMUNITY

## 2024-01-24 RX ORDER — POTASSIUM CHLORIDE 20 MEQ/1
40 TABLET, EXTENDED RELEASE ORAL PRN
Status: DISCONTINUED | OUTPATIENT
Start: 2024-01-24 | End: 2024-02-02 | Stop reason: HOSPADM

## 2024-01-24 RX ORDER — POLYETHYLENE GLYCOL 3350 17 G/17G
17 POWDER, FOR SOLUTION ORAL DAILY PRN
Status: DISCONTINUED | OUTPATIENT
Start: 2024-01-24 | End: 2024-02-02 | Stop reason: HOSPADM

## 2024-01-24 RX ORDER — ENOXAPARIN SODIUM 100 MG/ML
40 INJECTION SUBCUTANEOUS EVERY 24 HOURS
Status: DISCONTINUED | OUTPATIENT
Start: 2024-01-24 | End: 2024-02-02 | Stop reason: HOSPADM

## 2024-01-24 RX ORDER — SODIUM CHLORIDE 9 MG/ML
INJECTION, SOLUTION INTRAVENOUS PRN
Status: DISCONTINUED | OUTPATIENT
Start: 2024-01-24 | End: 2024-01-24 | Stop reason: SDUPTHER

## 2024-01-24 RX ADMIN — ACETAMINOPHEN 650 MG: 325 TABLET ORAL at 20:31

## 2024-01-24 RX ADMIN — PHENOBARBITAL SODIUM 191.1 MG: 65 INJECTION INTRAMUSCULAR at 23:09

## 2024-01-24 RX ADMIN — Medication 1 TABLET: at 23:05

## 2024-01-24 RX ADMIN — TRAZODONE HYDROCHLORIDE 50 MG: 50 TABLET ORAL at 23:04

## 2024-01-24 RX ADMIN — SODIUM CHLORIDE: 9 INJECTION, SOLUTION INTRAVENOUS at 14:47

## 2024-01-24 RX ADMIN — FOLIC ACID 1 MG: 1 TABLET ORAL at 23:05

## 2024-01-24 RX ADMIN — Medication 100 MG: at 23:05

## 2024-01-24 RX ADMIN — ENOXAPARIN SODIUM 40 MG: 100 INJECTION SUBCUTANEOUS at 23:05

## 2024-01-24 RX ADMIN — CEFTRIAXONE SODIUM 1000 MG: 1 INJECTION, POWDER, FOR SOLUTION INTRAMUSCULAR; INTRAVENOUS at 16:14

## 2024-01-24 RX ADMIN — SODIUM CHLORIDE: 9 INJECTION, SOLUTION INTRAVENOUS at 21:41

## 2024-01-24 RX ADMIN — IOPAMIDOL 80 ML: 755 INJECTION, SOLUTION INTRAVENOUS at 14:40

## 2024-01-24 RX ADMIN — SODIUM CHLORIDE, PRESERVATIVE FREE 10 ML: 5 INJECTION INTRAVENOUS at 21:40

## 2024-01-24 RX ADMIN — ESCITALOPRAM OXALATE 20 MG: 20 TABLET ORAL at 23:05

## 2024-01-24 ASSESSMENT — PAIN DESCRIPTION - ORIENTATION
ORIENTATION: MID;LEFT
ORIENTATION: LEFT
ORIENTATION: LEFT

## 2024-01-24 ASSESSMENT — PAIN - FUNCTIONAL ASSESSMENT
PAIN_FUNCTIONAL_ASSESSMENT: 0-10
PAIN_FUNCTIONAL_ASSESSMENT: 0-10
PAIN_FUNCTIONAL_ASSESSMENT: NONE - DENIES PAIN

## 2024-01-24 ASSESSMENT — PAIN DESCRIPTION - LOCATION
LOCATION: CHEST
LOCATION: CHEST
LOCATION: CHEST;SHOULDER
LOCATION: CHEST

## 2024-01-24 ASSESSMENT — PAIN SCALES - GENERAL
PAINLEVEL_OUTOF10: 6
PAINLEVEL_OUTOF10: 7
PAINLEVEL_OUTOF10: 8
PAINLEVEL_OUTOF10: 3

## 2024-01-24 ASSESSMENT — PAIN DESCRIPTION - DESCRIPTORS
DESCRIPTORS: SHARP
DESCRIPTORS: PRESSURE
DESCRIPTORS: ACHING;DULL;SORE

## 2024-01-24 NOTE — ED TRIAGE NOTES
Patient to ED through triage c/o chest pain. Patient states the pain started last night and continued to today. States the pain is like a pressure on her chest and goes into the left shoulder and arm. Rates pain 8/10. EKG completed. IV access established. Labs collected.

## 2024-01-24 NOTE — ED NOTES
ED to inpatient nurses report      Chief Complaint:  Chief Complaint   Patient presents with    Chest Pain     Present to ED from: home    MOA:     LOC: alert and orientated to name, place, date  Mobility: Independent  Oxygen Baseline: RA    Current needs required: RA     Code Status:   Prior    What abnormal results were found and what did you give/do to treat them? Antibiotics  Any procedures or intervention occur? N/A    Mental Status:  Level of Consciousness: Alert (0)    Psych Assessment:        Vitals:  Patient Vitals for the past 24 hrs:   BP Temp Temp src Pulse Resp SpO2 Height Weight   01/24/24 1451 118/89 -- -- 73 21 98 % -- --   01/24/24 1414 -- -- -- 68 23 98 % -- --   01/24/24 1247 130/89 98.2 °F (36.8 °C) Oral 78 20 100 % 1.549 m (5' 1\") 58.1 kg (128 lb)        LDAs:   Peripheral IV 01/24/24 Left Antecubital (Active)   Site Assessment Clean, dry & intact 01/24/24 1312   Line Status Brisk blood return;Flushed;Normal saline locked;Specimen collected 01/24/24 1312   Phlebitis Assessment No symptoms 01/24/24 1312   Infiltration Assessment 0 01/24/24 1312   Dressing Status New dressing applied;Clean, dry & intact 01/24/24 1312   Dressing Type Transparent 01/24/24 1312   Dressing Intervention New 01/24/24 1312       Ambulatory Status:  Presents to emergency department  because of falls (Syncope, seizure, or loss of consciousness): No, Age > 70: No, Altered Mental Status, Intoxication with alcohol or substance confusion (Disorientation, impaired judgment, poor safety awaremess, or inability to follow instructions): No, Impaired Mobility: Ambulates or transfers with assistive devices or assistance; Unable to ambulate or transer.: No, Nursing Judgement: No    Diagnosis:  DISPOSITION Decision To Admit 01/24/2024 04:10:09 PM   Final diagnoses:   Alcoholism (HCC)   Biliary calculus of other site without obstruction   Acute alcoholic gastritis without hemorrhage   Urinary tract infection without hematuria, site

## 2024-01-24 NOTE — ED PROVIDER NOTES
[facility] EMERGENCY DEPT      EMERGENCY MEDICINE     Room # 37/037A    Pt Name: Nancy Bruno  MRN: [id number]  Birthdate 1990  Date of evaluation: 2024  Provider: Umer Espinoza MD    CHIEF COMPLAINT       Chief Complaint   Patient presents with    Chest Pain     HISTORY OF PRESENT ILLNESS   Nancy Bruno is a pleasant 33 y.o. female who presents to the emergency department from from home, as a walk in to the ED lobby for evaluation of chest pain since last night.  Patient said that she is not doing anything when this happened feels like someone sitting on the chest going to the shoulder with numbness in both arms this lasted for 3 minutes.  Patient admits also that she had short of breath at the time.  Today the patient woke up with tingliness on the hands and numbness in the shoulder with chest pain.  Patient was concerned as she had a significant family history of heart disease.  2 to 3 days ago the patient had been having nausea vomiting yesterday vomited 3-4 times and none today.  Denies any flu symptoms no cough cold, no fever no chills no body malaise..Admit she is alcoholic drink 3 large beers daily and goes into widrawal when she stop drinking, shaking achy nausea vomiting. Last drink was last night beers and vodka    PASTMEDICAL HISTORY     Past Medical History:   Diagnosis Date    Abnormal Pap smear of cervix     LSIL, pt never returned for colp    Anemia     during and outside of pregnancies    Chlamydia     Depression     was taking medication       Patient Active Problem List   Diagnosis Code    Declines  (vaginal birth after ) trial O34.219    Moderate episode of recurrent major depressive disorder (HCC) F33.1    Generalized anxiety disorder with panic attacks F41.1, F41.0    Gastroesophageal reflux disease K21.9    Generalized hyperhidrosis R61     SURGICAL HISTORY       Past Surgical History:   Procedure Laterality Date     SECTION      x3

## 2024-01-24 NOTE — H&P
Musculoskeletal:  Negative for arthralgias and gait problem.   Skin:  Negative for color change.   Neurological:  Positive for dizziness and tremors. Negative for weakness and light-headedness.   Psychiatric/Behavioral:  Negative for agitation, confusion and hallucinations. The patient is nervous/anxious.         PHYSICAL EXAM:  /74   Pulse 66   Temp 98.2 °F (36.8 °C) (Oral)   Resp 18   Ht 1.549 m (5' 1\")   Wt 58.1 kg (128 lb)   SpO2 96%   BMI 24.19 kg/m²   General appearance: Acutely ill appearing. No apparent distress. Appears stated age and cooperative.  Skin: Slightly diaphoretic. Skin color, texture, turgor normal.  No rashes or lesions.  HEENT: Normal cephalic, atraumatic without obvious deformity. Pupils equal, round, and reactive to light.  Extra-ocular muscles intact. Conjunctivae/corneas clear.  Neck: Trachea midline. Supple, with full range of motion.  Cardiovascular: Regular rate and rhythm with normal S1/S2. No murmurs, rubs or gallops.  Respiratory:  Normal respiratory effort. Clear to auscultation, bilaterally without rales, wheezes, or rhonchi.  Abdomen: Soft, non-tender, non-distended. Normal bowel sounds. Negative Miranda's sign.  Musculoskeletal:  No weakness or instability noted.  No edema, erythema, or gross deformity noted.   Vascular: Capillary refill brisk,< 3 seconds. Pulses +2 palpable, equal bilaterally.  Neurologic: Mild tremor noted in upper extremities. Neurovascularly intact without any focal sensory/motor deficits. Cranial nerves: II-XII grossly intact.   Psychiatric: Anxious. Alert and oriented, thought content appropriate, normal insight      Labs:   Recent Labs     01/24/24  1300   WBC 5.6   HGB 11.5*   HCT 34.8*        Recent Labs     01/24/24  1300      K 3.9      CO2 23   BUN 9   CREATININE 0.5   CALCIUM 8.4*     Recent Labs     01/24/24  1300   AST 74*   *   BILIDIR <0.2   BILITOT 0.2*   ALKPHOS 121     No results for input(s): \"INR\" in  the last 72 hours.  No results for input(s): \"CKTOTAL\", \"TROPONINI\" in the last 72 hours.    Urinalysis:    Lab Results   Component Value Date/Time    NITRU POSITIVE 01/24/2024 02:00 PM    WBCUA  01/24/2024 02:00 PM    BACTERIA MANY 01/24/2024 02:00 PM    RBCUA NONE 01/24/2024 02:00 PM    BLOODU TRACE 01/24/2024 02:00 PM    GLUCOSEU NEGATIVE 01/24/2024 02:00 PM       Radiology:   US GALLBLADDER RUQ   Final Result   1. Cholelithiasis without evidence of acute cholecystitis.   2. Hyperechoic structure in the right hepatic lobe, likely a hemangioma.   3. Probable left hepatic cyst.      Final report electronically signed by Dr. Finesse Chavira on 1/24/2024 3:32 PM      CTA CHEST W WO CONTRAST   Final Result   1. No pulmonary embolism.   2. No acute intrathoracic findings.      Final report electronically signed by Dr. Finesse Chavira on 1/24/2024 3:20 PM        US GALLBLADDER RUQ    Result Date: 1/24/2024  PROCEDURE: US GALLBLADDER RUQ CLINICAL INFORMATION: Elevated liver function tests TECHNIQUE: Ultrasound of the right upper quadrant was performed. Grayscale and color images were obtained. COMPARISON: None FINDINGS: There are poorly defined calcifications in the gallbladder. There is no gallbladder wall thickening or pericholecystic fluid. The common bile duct is normal, measuring 0.3 cm. There is no intrahepatic biliary ductal dilation. An avascular anechoic structure in the left hepatic lobe measures 0.5 cm. A hyperechoic structure in the right hepatic lobe measures 1.0 cm.     1. Cholelithiasis without evidence of acute cholecystitis. 2. Hyperechoic structure in the right hepatic lobe, likely a hemangioma. 3. Probable left hepatic cyst. Final report electronically signed by Dr. Finesse Chavira on 1/24/2024 3:32 PM    CTA CHEST W WO CONTRAST    Result Date: 1/24/2024  PROCEDURE: CTA CHEST W WO CONTRAST CLINICAL INFORMATION: Chest pain TECHNIQUE: CTA of the chest was performed following administration of 80 mL

## 2024-01-24 NOTE — ED NOTES
Patient returned from bathroom. Urine sample collected. Patient returned to bed. Respirations easy and unlabored. Call light in reach. Patient has no further requests at this time.

## 2024-01-24 NOTE — ED NOTES
IV fluids infusing. Patient updated on POC. Respirations easy and unlabored. No distress noted. Pt expressed no needs at this time. Call light in reach. Family at bedside.

## 2024-01-25 ENCOUNTER — APPOINTMENT (OUTPATIENT)
Age: 34
DRG: 263 | End: 2024-01-25
Payer: COMMERCIAL

## 2024-01-25 LAB
ALBUMIN SERPL BCG-MCNC: 3.3 G/DL (ref 3.5–5.1)
ALP SERPL-CCNC: 106 U/L (ref 38–126)
ALT SERPL W/O P-5'-P-CCNC: 67 U/L (ref 11–66)
ANION GAP SERPL CALC-SCNC: 15 MEQ/L (ref 8–16)
AST SERPL-CCNC: 33 U/L (ref 5–40)
BASOPHILS ABSOLUTE: 0 THOU/MM3 (ref 0–0.1)
BASOPHILS NFR BLD AUTO: 0.3 %
BILIRUB SERPL-MCNC: 0.3 MG/DL (ref 0.3–1.2)
BUN SERPL-MCNC: 8 MG/DL (ref 7–22)
CALCIUM SERPL-MCNC: 8.4 MG/DL (ref 8.5–10.5)
CHLORIDE SERPL-SCNC: 105 MEQ/L (ref 98–111)
CHOLEST SERPL-MCNC: 108 MG/DL (ref 100–199)
CO2 SERPL-SCNC: 21 MEQ/L (ref 23–33)
CREAT SERPL-MCNC: 0.6 MG/DL (ref 0.4–1.2)
DEPRECATED MEAN GLUCOSE BLD GHB EST-ACNC: 87 MG/DL (ref 70–126)
DEPRECATED RDW RBC AUTO: 49.8 FL (ref 35–45)
ECHO AV CUSP MM: 1.8 CM
ECHO AV PEAK GRADIENT: 9 MMHG
ECHO AV PEAK VELOCITY: 1.5 M/S
ECHO AV VELOCITY RATIO: 0.8
ECHO BSA: 1.57 M2
ECHO LA AREA 2C: 11.9 CM2
ECHO LA AREA 4C: 10.8 CM2
ECHO LA DIAMETER INDEX: 2.26 CM/M2
ECHO LA DIAMETER: 3.6 CM
ECHO LA MAJOR AXIS: 4.2 CM
ECHO LA MINOR AXIS: 4.3 CM
ECHO LA VOL BP: 25 ML (ref 22–52)
ECHO LA VOL MOD A2C: 27 ML (ref 22–52)
ECHO LA VOL MOD A4C: 22 ML (ref 22–52)
ECHO LA VOL/BSA BIPLANE: 16 ML/M2 (ref 16–34)
ECHO LA VOLUME INDEX MOD A2C: 17 ML/M2 (ref 16–34)
ECHO LA VOLUME INDEX MOD A4C: 14 ML/M2 (ref 16–34)
ECHO LV E' LATERAL VELOCITY: 17 CM/S
ECHO LV E' SEPTAL VELOCITY: 11 CM/S
ECHO LV FRACTIONAL SHORTENING: 43 % (ref 28–44)
ECHO LV INTERNAL DIMENSION DIASTOLE INDEX: 2.77 CM/M2
ECHO LV INTERNAL DIMENSION DIASTOLIC: 4.4 CM (ref 3.9–5.3)
ECHO LV INTERNAL DIMENSION SYSTOLIC INDEX: 1.57 CM/M2
ECHO LV INTERNAL DIMENSION SYSTOLIC: 2.5 CM
ECHO LV IVSD: 0.7 CM (ref 0.6–0.9)
ECHO LV MASS 2D: 92.1 G (ref 67–162)
ECHO LV MASS INDEX 2D: 57.9 G/M2 (ref 43–95)
ECHO LV POSTERIOR WALL DIASTOLIC: 0.7 CM (ref 0.6–0.9)
ECHO LV RELATIVE WALL THICKNESS RATIO: 0.32
ECHO LVOT PEAK GRADIENT: 6 MMHG
ECHO LVOT PEAK VELOCITY: 1.2 M/S
ECHO MV A VELOCITY: 0.52 M/S
ECHO MV E DECELERATION TIME (DT): 243 MS
ECHO MV E VELOCITY: 0.93 M/S
ECHO MV E/A RATIO: 1.79
ECHO MV E/E' LATERAL: 5.47
ECHO MV E/E' RATIO (AVERAGED): 6.96
ECHO MV REGURGITANT PEAK GRADIENT: 38 MMHG
ECHO MV REGURGITANT PEAK VELOCITY: 3.1 M/S
ECHO PULMONARY ARTERY END DIASTOLIC PRESSURE: 5 MMHG
ECHO PV MAX VELOCITY: 0.8 M/S
ECHO PV PEAK GRADIENT: 2 MMHG
ECHO PV REGURGITANT MAX VELOCITY: 1.1 M/S
ECHO RV INTERNAL DIMENSION: 2.7 CM
ECHO TV E WAVE: 0.5 M/S
EKG ATRIAL RATE: 69 BPM
EKG P AXIS: 71 DEGREES
EKG P-R INTERVAL: 140 MS
EKG Q-T INTERVAL: 430 MS
EKG QRS DURATION: 78 MS
EKG QTC CALCULATION (BAZETT): 460 MS
EKG R AXIS: 87 DEGREES
EKG T AXIS: 73 DEGREES
EKG VENTRICULAR RATE: 69 BPM
EOSINOPHIL NFR BLD AUTO: 1.9 %
EOSINOPHILS ABSOLUTE: 0.1 THOU/MM3 (ref 0–0.4)
ERYTHROCYTE [DISTWIDTH] IN BLOOD BY AUTOMATED COUNT: 14.4 % (ref 11.5–14.5)
GFR SERPL CREATININE-BSD FRML MDRD: > 60 ML/MIN/1.73M2
GLUCOSE SERPL-MCNC: 71 MG/DL (ref 70–108)
HBA1C MFR BLD HPLC: 4.9 % (ref 4.4–6.4)
HCT VFR BLD AUTO: 34.4 % (ref 37–47)
HDLC SERPL-MCNC: 36 MG/DL
HGB BLD-MCNC: 11 GM/DL (ref 12–16)
IMM GRANULOCYTES # BLD AUTO: 0.01 THOU/MM3 (ref 0–0.07)
IMM GRANULOCYTES NFR BLD AUTO: 0.3 %
LDLC SERPL CALC-MCNC: 47 MG/DL
LYMPHOCYTES ABSOLUTE: 1.7 THOU/MM3 (ref 1–4.8)
LYMPHOCYTES NFR BLD AUTO: 45.3 %
MCH RBC QN AUTO: 30.1 PG (ref 26–33)
MCHC RBC AUTO-ENTMCNC: 32 GM/DL (ref 32.2–35.5)
MCV RBC AUTO: 94.2 FL (ref 81–99)
MONOCYTES ABSOLUTE: 0.3 THOU/MM3 (ref 0.4–1.3)
MONOCYTES NFR BLD AUTO: 7 %
NEUTROPHILS NFR BLD AUTO: 45.2 %
NRBC BLD AUTO-RTO: 0 /100 WBC
OSMOLALITY SERPL CALC.SUM OF ELEC: 278.1 MOSMOL/KG (ref 275–300)
PLATELET # BLD AUTO: 158 THOU/MM3 (ref 130–400)
PMV BLD AUTO: 10.7 FL (ref 9.4–12.4)
POTASSIUM SERPL-SCNC: 4.6 MEQ/L (ref 3.5–5.2)
PROT SERPL-MCNC: 5.9 G/DL (ref 6.1–8)
RBC # BLD AUTO: 3.65 MILL/MM3 (ref 4.2–5.4)
SEGMENTED NEUTROPHILS ABSOLUTE COUNT: 1.7 THOU/MM3 (ref 1.8–7.7)
SODIUM SERPL-SCNC: 141 MEQ/L (ref 135–145)
TRIGL SERPL-MCNC: 123 MG/DL (ref 0–199)
WBC # BLD AUTO: 3.7 THOU/MM3 (ref 4.8–10.8)

## 2024-01-25 PROCEDURE — 93306 TTE W/DOPPLER COMPLETE: CPT

## 2024-01-25 PROCEDURE — 6370000000 HC RX 637 (ALT 250 FOR IP)

## 2024-01-25 PROCEDURE — 93005 ELECTROCARDIOGRAM TRACING: CPT | Performed by: NURSE PRACTITIONER

## 2024-01-25 PROCEDURE — G0378 HOSPITAL OBSERVATION PER HR: HCPCS

## 2024-01-25 PROCEDURE — 99232 SBSQ HOSP IP/OBS MODERATE 35: CPT | Performed by: NURSE PRACTITIONER

## 2024-01-25 PROCEDURE — 93306 TTE W/DOPPLER COMPLETE: CPT | Performed by: INTERNAL MEDICINE

## 2024-01-25 PROCEDURE — 96366 THER/PROPH/DIAG IV INF ADDON: CPT

## 2024-01-25 PROCEDURE — 83036 HEMOGLOBIN GLYCOSYLATED A1C: CPT

## 2024-01-25 PROCEDURE — 93010 ELECTROCARDIOGRAM REPORT: CPT | Performed by: INTERNAL MEDICINE

## 2024-01-25 PROCEDURE — 80061 LIPID PANEL: CPT

## 2024-01-25 PROCEDURE — 85025 COMPLETE CBC W/AUTO DIFF WBC: CPT

## 2024-01-25 PROCEDURE — 96372 THER/PROPH/DIAG INJ SC/IM: CPT

## 2024-01-25 PROCEDURE — 6360000002 HC RX W HCPCS

## 2024-01-25 PROCEDURE — 6360000002 HC RX W HCPCS: Performed by: NURSE PRACTITIONER

## 2024-01-25 PROCEDURE — 2580000003 HC RX 258

## 2024-01-25 PROCEDURE — 36415 COLL VENOUS BLD VENIPUNCTURE: CPT

## 2024-01-25 PROCEDURE — 96375 TX/PRO/DX INJ NEW DRUG ADDON: CPT

## 2024-01-25 PROCEDURE — 80053 COMPREHEN METABOLIC PANEL: CPT

## 2024-01-25 RX ORDER — KETOROLAC TROMETHAMINE 30 MG/ML
30 INJECTION, SOLUTION INTRAMUSCULAR; INTRAVENOUS ONCE
Status: COMPLETED | OUTPATIENT
Start: 2024-01-25 | End: 2024-01-25

## 2024-01-25 RX ORDER — HYDROXYZINE PAMOATE 25 MG/1
25 CAPSULE ORAL 3 TIMES DAILY PRN
Status: DISCONTINUED | OUTPATIENT
Start: 2024-01-25 | End: 2024-02-02 | Stop reason: HOSPADM

## 2024-01-25 RX ADMIN — PHENOBARBITAL SODIUM 191.1 MG: 65 INJECTION INTRAMUSCULAR at 05:53

## 2024-01-25 RX ADMIN — PHENOBARBITAL SODIUM 191.1 MG: 65 INJECTION INTRAMUSCULAR at 03:09

## 2024-01-25 RX ADMIN — ESCITALOPRAM OXALATE 20 MG: 20 TABLET ORAL at 08:15

## 2024-01-25 RX ADMIN — PHENOBARBITAL 64.8 MG: 32.4 TABLET ORAL at 20:07

## 2024-01-25 RX ADMIN — SODIUM CHLORIDE, PRESERVATIVE FREE 10 ML: 5 INJECTION INTRAVENOUS at 20:07

## 2024-01-25 RX ADMIN — ENOXAPARIN SODIUM 40 MG: 100 INJECTION SUBCUTANEOUS at 20:07

## 2024-01-25 RX ADMIN — FOLIC ACID 1 MG: 1 TABLET ORAL at 08:15

## 2024-01-25 RX ADMIN — Medication 1 TABLET: at 08:15

## 2024-01-25 RX ADMIN — KETOROLAC TROMETHAMINE 30 MG: 30 INJECTION, SOLUTION INTRAMUSCULAR; INTRAVENOUS at 13:13

## 2024-01-25 RX ADMIN — PANTOPRAZOLE SODIUM 40 MG: 40 TABLET, DELAYED RELEASE ORAL at 05:51

## 2024-01-25 RX ADMIN — ACETAMINOPHEN 650 MG: 325 TABLET ORAL at 11:29

## 2024-01-25 RX ADMIN — PHENOBARBITAL 64.8 MG: 32.4 TABLET ORAL at 08:17

## 2024-01-25 RX ADMIN — Medication 100 MG: at 08:15

## 2024-01-25 RX ADMIN — CEFTRIAXONE SODIUM 1000 MG: 1 INJECTION, POWDER, FOR SOLUTION INTRAMUSCULAR; INTRAVENOUS at 16:12

## 2024-01-25 RX ADMIN — PANTOPRAZOLE SODIUM 40 MG: 40 TABLET, DELAYED RELEASE ORAL at 16:07

## 2024-01-25 ASSESSMENT — PAIN SCALES - GENERAL
PAINLEVEL_OUTOF10: 2
PAINLEVEL_OUTOF10: 7
PAINLEVEL_OUTOF10: 7
PAINLEVEL_OUTOF10: 5
PAINLEVEL_OUTOF10: 6
PAINLEVEL_OUTOF10: 3

## 2024-01-25 ASSESSMENT — PATIENT HEALTH QUESTIONNAIRE - PHQ9
SUM OF ALL RESPONSES TO PHQ QUESTIONS 1-9: 14
9. THOUGHTS THAT YOU WOULD BE BETTER OFF DEAD, OR OF HURTING YOURSELF: 0
8. MOVING OR SPEAKING SO SLOWLY THAT OTHER PEOPLE COULD HAVE NOTICED. OR THE OPPOSITE, BEING SO FIGETY OR RESTLESS THAT YOU HAVE BEEN MOVING AROUND A LOT MORE THAN USUAL: 2
1. LITTLE INTEREST OR PLEASURE IN DOING THINGS: 2
4. FEELING TIRED OR HAVING LITTLE ENERGY: 2
SUM OF ALL RESPONSES TO PHQ9 QUESTIONS 1 & 2: 3
SUM OF ALL RESPONSES TO PHQ QUESTIONS 1-9: 14
3. TROUBLE FALLING OR STAYING ASLEEP: 3
2. FEELING DOWN, DEPRESSED OR HOPELESS: 1
SUM OF ALL RESPONSES TO PHQ QUESTIONS 1-9: 14
7. TROUBLE CONCENTRATING ON THINGS, SUCH AS READING THE NEWSPAPER OR WATCHING TELEVISION: 3
SUM OF ALL RESPONSES TO PHQ QUESTIONS 1-9: 14
5. POOR APPETITE OR OVEREATING: 1
6. FEELING BAD ABOUT YOURSELF - OR THAT YOU ARE A FAILURE OR HAVE LET YOURSELF OR YOUR FAMILY DOWN: 0
10. IF YOU CHECKED OFF ANY PROBLEMS, HOW DIFFICULT HAVE THESE PROBLEMS MADE IT FOR YOU TO DO YOUR WORK, TAKE CARE OF THINGS AT HOME, OR GET ALONG WITH OTHER PEOPLE: 1

## 2024-01-25 ASSESSMENT — PAIN DESCRIPTION - LOCATION
LOCATION: CHEST;SHOULDER
LOCATION: SHOULDER

## 2024-01-25 ASSESSMENT — LIFESTYLE VARIABLES
HOW MANY STANDARD DRINKS CONTAINING ALCOHOL DO YOU HAVE ON A TYPICAL DAY: 3 OR 4
HOW OFTEN DO YOU HAVE A DRINK CONTAINING ALCOHOL: 4 OR MORE TIMES A WEEK

## 2024-01-25 ASSESSMENT — PAIN DESCRIPTION - DESCRIPTORS
DESCRIPTORS: ACHING
DESCRIPTORS: ACHING;SORE
DESCRIPTORS: TIGHTNESS
DESCRIPTORS: ACHING;TIGHTNESS

## 2024-01-25 ASSESSMENT — PAIN - FUNCTIONAL ASSESSMENT
PAIN_FUNCTIONAL_ASSESSMENT: ACTIVITIES ARE NOT PREVENTED

## 2024-01-25 ASSESSMENT — PAIN DESCRIPTION - FREQUENCY: FREQUENCY: INTERMITTENT

## 2024-01-25 ASSESSMENT — PAIN DESCRIPTION - ORIENTATION
ORIENTATION: LEFT
ORIENTATION: LEFT
ORIENTATION: LOWER
ORIENTATION: LEFT

## 2024-01-25 ASSESSMENT — PAIN DESCRIPTION - PAIN TYPE
TYPE: ACUTE PAIN
TYPE: ACUTE PAIN

## 2024-01-25 ASSESSMENT — PAIN DESCRIPTION - ONSET: ONSET: ON-GOING

## 2024-01-25 NOTE — PLAN OF CARE
Problem: Discharge Planning  Goal: Discharge to home or other facility with appropriate resources  Outcome: Progressing     Problem: Pain  Goal: Verbalizes/displays adequate comfort level or baseline comfort level  Outcome: Progressing     Problem: Genitourinary - Adult  Goal: Absence of urinary retention  Outcome: Progressing     Problem: Infection - Adult  Goal: Absence of infection at discharge  Outcome: Progressing  Goal: Absence of infection during hospitalization  Outcome: Progressing  Goal: Absence of fever/infection during anticipated neutropenic period  Outcome: Progressing     Problem: Metabolic/Fluid and Electrolytes - Adult  Goal: Electrolytes maintained within normal limits  Outcome: Progressing  Goal: Hemodynamic stability and optimal renal function maintained  Outcome: Progressing  Goal: Glucose maintained within prescribed range  Outcome: Progressing     Problem: Hematologic - Adult  Goal: Maintains hematologic stability  Outcome: Progressing

## 2024-01-25 NOTE — ED NOTES
Patient resting in bed watching TV. Patient reports left chest pain 7/10. Provider notified. Patient has no further requests at this time. Call light in reach.

## 2024-01-25 NOTE — CARE COORDINATION
1/25/24, 11:22 AM EST    DISCHARGE PLANNING EVALUATION    Received Social Work consult “For consideration of Rehab”.  Addiction/CLARISA  consulted.   met with patient, following for needs.  Full Social Consult deferred.

## 2024-01-25 NOTE — CARE COORDINATION
Case Management Assessment  Initial Evaluation    Date/Time of Evaluation: 1/25/2024 11:03 AM  Assessment Completed by: Patricia Chavira RN    If patient is discharged prior to next notation, then this note serves as note for discharge by case management.    Patient Name: Nancy Bruno                   YOB: 1990  Diagnosis: Alcoholism (HCC) [F10.20]  Urinary tract infection without hematuria, site unspecified [N39.0]  Biliary calculus of other site without obstruction [K80.80]  Chest pain in adult [R07.9]  Acute alcoholic gastritis without hemorrhage [K29.20]                   Date / Time: 1/24/2024 12:43 PM  [facility]     Patient Admission Status: Observation   Readmission Risk Low 0-14, Mod 15-19), High > 20: No data recorded  Current PCP: Shola Davis APRN - CNP  PCP verified by CM? Yes    Chart Reviewed: Yes      History Provided by: Patient  Patient Orientation: Alert and Oriented    Patient Cognition: Alert    Hospitalization in the last 30 days (Readmission):  No    If yes, Readmission Assessment in CM Navigator will be completed.    Advance Directives:      Code Status: Full Code   Patient's Primary Decision Maker is: Patient Declined (Legal Next of Kin Remains as Decision Maker)      Discharge Planning:    Patient lives with: Parent, Family Members Type of Home: House  Primary Care Giver: Self  Patient Support Systems include: Parent, Family Members   Current Financial resources: Medicaid  Current community resources: None  Current services prior to admission: None            Current DME:              Type of Home Care services:  None    ADLS  Prior functional level: Independent in ADLs/IADLs  Current functional level: Independent in ADLs/IADLs    Family can provide assistance at DC: Yes  Would you like Case Management to discuss the discharge plan with any other family members/significant others, and if so, who? No  Plans to Return to Present Housing: Yes  Other Identified

## 2024-01-25 NOTE — PROGRESS NOTES
Oral Daily    folic acid  1 mg Oral Daily     PRN Meds: traZODone, potassium chloride **OR** potassium alternative oral replacement **OR** potassium chloride, magnesium sulfate, ondansetron **OR** ondansetron, polyethylene glycol, acetaminophen **OR** acetaminophen, sodium chloride flush, sodium chloride      Intake/Output Summary (Last 24 hours) at 1/25/2024 0719  Last data filed at 1/25/2024 0630  Gross per 24 hour   Intake 1540.82 ml   Output --   Net 1540.82 ml       Diet:  ADULT DIET; Regular; GI [place] (GERD/Peptic Ulcer)    Exam:  /69   Pulse 56   Temp 98.2 °F (36.8 °C) (Oral)   Resp 16   Ht 1.549 m (5' 1\")   Wt 60.6 kg (133 lb 9.6 oz)   SpO2 100%   BMI 25.24 kg/m²     General appearance: No apparent distress, appears stated age and cooperative.  HEENT: Pupils equal, round, and reactive to light. Conjunctivae/corneas clear.  Neck: Supple, with full range of motion. No jugular venous distention. Trachea midline.  Respiratory:  Normal respiratory effort. Clear to auscultation, bilaterally without Rales/Wheezes/Rhonchi.  Cardiovascular: Regular rate and rhythm with normal S1/S2 without murmurs, rubs or gallops.  Left anterior chest wall tender to palpation  Abdomen: Soft, non-tender, non-distended with normal bowel sounds.  Musculoskeletal: passive and active ROM x 4 extremities.  Skin: Skin color, texture, turgor normal.    Neurologic:  Neurovascularly intact without any focal sensory/motor deficits. Cranial nerves: II-XII intact, grossly non-focal.  Psychiatric: Alert and oriented, thought content appropriate  Capillary Refill: Brisk,< 3 seconds   Peripheral Pulses: +2 palpable, equal bilaterally       Labs:   Recent Labs     01/24/24  1300 01/25/24  0608   WBC 5.6 3.7*   HGB 11.5* 11.0*   HCT 34.8* 34.4*    158     Recent Labs     01/24/24  1300      K 3.9      CO2 23   BUN 9   CREATININE 0.5   CALCIUM 8.4*     Recent Labs     01/24/24  1300   AST 74*   *   BILIDIR <0.2    BILITOT 0.2*   ALKPHOS 121     Microbiology:    Urine cultures pending    Urinalysis:      Lab Results   Component Value Date/Time    NITRU POSITIVE 01/24/2024 02:00 PM    WBCUA  01/24/2024 02:00 PM    BACTERIA MANY 01/24/2024 02:00 PM    RBCUA NONE 01/24/2024 02:00 PM    BLOODU TRACE 01/24/2024 02:00 PM    GLUCOSEU NEGATIVE 01/24/2024 02:00 PM       Radiology:  US GALLBLADDER RUQ    Result Date: 1/24/2024  PROCEDURE: US GALLBLADDER RUQ CLINICAL INFORMATION: Elevated liver function tests TECHNIQUE: Ultrasound of the right upper quadrant was performed. Grayscale and color images were obtained. COMPARISON: None FINDINGS: There are poorly defined calcifications in the gallbladder. There is no gallbladder wall thickening or pericholecystic fluid. The common bile duct is normal, measuring 0.3 cm. There is no intrahepatic biliary ductal dilation. An avascular anechoic structure in the left hepatic lobe measures 0.5 cm. A hyperechoic structure in the right hepatic lobe measures 1.0 cm.     1. Cholelithiasis without evidence of acute cholecystitis. 2. Hyperechoic structure in the right hepatic lobe, likely a hemangioma. 3. Probable left hepatic cyst. Final report electronically signed by Dr. Finesse Chavira on 1/24/2024 3:32 PM    CTA CHEST W WO CONTRAST    Result Date: 1/24/2024  PROCEDURE: CTA CHEST W WO CONTRAST CLINICAL INFORMATION: Chest pain TECHNIQUE: CTA of the chest was performed following administration of 80 mL Isovue-370 intravenous contrast. Axial images as well as coronal and sagittal MIP reconstructions were obtained. All CT scans at this facility use dose modulation, iterative reconstruction, and/or weight-based dosing when appropriate to reduce radiation dose to as low as reasonably achievable. COMPARISON: None FINDINGS: There are no filling defects or lack of contrast opacification in the visualized pulmonary arteries to suggest thromboembolism. Cardiac size is normal. There is no pleural or

## 2024-01-25 NOTE — CONSULTS
Brief Intervention and Referral to Treatment Summary    Patient was provided PHQ-9, AUDIT-C and DAST Screening:      PHQ-9 Score: 14  AUDIT-C Score:  8  DAST Score:  0    Patient’s substance use is considered     Harmful      Patient’s depression is considered:     Moderate    Brief Education Was Provided    Patient was receptive      Brief Intervention Is Provided (Only for AUDIT-C or DAST)     Patient reports readiness to decrease and/or stop use and a plan was discussed   Patient denies readiness to decrease and/or stop use and a plan was not discussed      Recommendations/Referrals for Brief and/or Specialized Treatment Provided to Patient:  Resources provided to patient.

## 2024-01-26 PROBLEM — F10.239 ALCOHOL DEPENDENCE WITH WITHDRAWAL (HCC): Status: ACTIVE | Noted: 2024-01-26

## 2024-01-26 LAB
ALBUMIN SERPL BCG-MCNC: 3.4 G/DL (ref 3.5–5.1)
ALP SERPL-CCNC: 92 U/L (ref 38–126)
ALT SERPL W/O P-5'-P-CCNC: 48 U/L (ref 11–66)
ANION GAP SERPL CALC-SCNC: 12 MEQ/L (ref 8–16)
AST SERPL-CCNC: 20 U/L (ref 5–40)
BACTERIA UR CULT: ABNORMAL
BASOPHILS ABSOLUTE: 0 THOU/MM3 (ref 0–0.1)
BASOPHILS NFR BLD AUTO: 0.4 %
BILIRUB SERPL-MCNC: 0.2 MG/DL (ref 0.3–1.2)
BUN SERPL-MCNC: 9 MG/DL (ref 7–22)
CALCIUM SERPL-MCNC: 8.6 MG/DL (ref 8.5–10.5)
CHLORIDE SERPL-SCNC: 99 MEQ/L (ref 98–111)
CO2 SERPL-SCNC: 25 MEQ/L (ref 23–33)
CREAT SERPL-MCNC: 0.6 MG/DL (ref 0.4–1.2)
DEPRECATED RDW RBC AUTO: 48.4 FL (ref 35–45)
EOSINOPHIL NFR BLD AUTO: 1.8 %
EOSINOPHILS ABSOLUTE: 0.1 THOU/MM3 (ref 0–0.4)
ERYTHROCYTE [DISTWIDTH] IN BLOOD BY AUTOMATED COUNT: 13.9 % (ref 11.5–14.5)
GFR SERPL CREATININE-BSD FRML MDRD: > 60 ML/MIN/1.73M2
GLUCOSE SERPL-MCNC: 82 MG/DL (ref 70–108)
HCT VFR BLD AUTO: 36 % (ref 37–47)
HGB BLD-MCNC: 11.3 GM/DL (ref 12–16)
IMM GRANULOCYTES # BLD AUTO: 0.01 THOU/MM3 (ref 0–0.07)
IMM GRANULOCYTES NFR BLD AUTO: 0.2 %
LYMPHOCYTES ABSOLUTE: 1.7 THOU/MM3 (ref 1–4.8)
LYMPHOCYTES NFR BLD AUTO: 37.7 %
MCH RBC QN AUTO: 29.7 PG (ref 26–33)
MCHC RBC AUTO-ENTMCNC: 31.4 GM/DL (ref 32.2–35.5)
MCV RBC AUTO: 94.5 FL (ref 81–99)
MONOCYTES ABSOLUTE: 0.3 THOU/MM3 (ref 0.4–1.3)
MONOCYTES NFR BLD AUTO: 7.1 %
NEUTROPHILS NFR BLD AUTO: 52.8 %
NRBC BLD AUTO-RTO: 0 /100 WBC
ORGANISM: ABNORMAL
PLATELET # BLD AUTO: 145 THOU/MM3 (ref 130–400)
PMV BLD AUTO: 10.6 FL (ref 9.4–12.4)
POTASSIUM SERPL-SCNC: 4.3 MEQ/L (ref 3.5–5.2)
PROT SERPL-MCNC: 5.9 G/DL (ref 6.1–8)
RBC # BLD AUTO: 3.81 MILL/MM3 (ref 4.2–5.4)
SEGMENTED NEUTROPHILS ABSOLUTE COUNT: 2.4 THOU/MM3 (ref 1.8–7.7)
SODIUM SERPL-SCNC: 136 MEQ/L (ref 135–145)
WBC # BLD AUTO: 4.5 THOU/MM3 (ref 4.8–10.8)

## 2024-01-26 PROCEDURE — 99232 SBSQ HOSP IP/OBS MODERATE 35: CPT | Performed by: NURSE PRACTITIONER

## 2024-01-26 PROCEDURE — 1200000000 HC SEMI PRIVATE

## 2024-01-26 PROCEDURE — 36415 COLL VENOUS BLD VENIPUNCTURE: CPT

## 2024-01-26 PROCEDURE — 6370000000 HC RX 637 (ALT 250 FOR IP): Performed by: NURSE PRACTITIONER

## 2024-01-26 PROCEDURE — 2580000003 HC RX 258

## 2024-01-26 PROCEDURE — 6370000000 HC RX 637 (ALT 250 FOR IP)

## 2024-01-26 PROCEDURE — 6360000002 HC RX W HCPCS

## 2024-01-26 PROCEDURE — 93010 ELECTROCARDIOGRAM REPORT: CPT | Performed by: INTERNAL MEDICINE

## 2024-01-26 PROCEDURE — 85025 COMPLETE CBC W/AUTO DIFF WBC: CPT

## 2024-01-26 PROCEDURE — 80053 COMPREHEN METABOLIC PANEL: CPT

## 2024-01-26 RX ORDER — IBUPROFEN 200 MG
400 TABLET ORAL
Status: DISCONTINUED | OUTPATIENT
Start: 2024-01-26 | End: 2024-01-30

## 2024-01-26 RX ADMIN — PHENOBARBITAL 32.4 MG: 32.4 TABLET ORAL at 08:44

## 2024-01-26 RX ADMIN — SODIUM CHLORIDE, PRESERVATIVE FREE 10 ML: 5 INJECTION INTRAVENOUS at 20:19

## 2024-01-26 RX ADMIN — IBUPROFEN 400 MG: 200 TABLET, FILM COATED ORAL at 16:09

## 2024-01-26 RX ADMIN — SODIUM CHLORIDE, PRESERVATIVE FREE 10 ML: 5 INJECTION INTRAVENOUS at 08:44

## 2024-01-26 RX ADMIN — ENOXAPARIN SODIUM 40 MG: 100 INJECTION SUBCUTANEOUS at 20:18

## 2024-01-26 RX ADMIN — ESCITALOPRAM OXALATE 20 MG: 20 TABLET ORAL at 08:43

## 2024-01-26 RX ADMIN — FOLIC ACID 1 MG: 1 TABLET ORAL at 08:43

## 2024-01-26 RX ADMIN — IBUPROFEN 400 MG: 200 TABLET, FILM COATED ORAL at 12:03

## 2024-01-26 RX ADMIN — CEFTRIAXONE SODIUM 1000 MG: 1 INJECTION, POWDER, FOR SOLUTION INTRAMUSCULAR; INTRAVENOUS at 16:12

## 2024-01-26 RX ADMIN — Medication 100 MG: at 08:44

## 2024-01-26 RX ADMIN — Medication 1 TABLET: at 08:44

## 2024-01-26 RX ADMIN — PANTOPRAZOLE SODIUM 40 MG: 40 TABLET, DELAYED RELEASE ORAL at 16:09

## 2024-01-26 RX ADMIN — PANTOPRAZOLE SODIUM 40 MG: 40 TABLET, DELAYED RELEASE ORAL at 05:31

## 2024-01-26 RX ADMIN — PHENOBARBITAL 32.4 MG: 32.4 TABLET ORAL at 20:18

## 2024-01-26 ASSESSMENT — PAIN DESCRIPTION - DESCRIPTORS
DESCRIPTORS: ACHING
DESCRIPTORS: ACHING
DESCRIPTORS: CRAMPING

## 2024-01-26 ASSESSMENT — PAIN SCALES - GENERAL
PAINLEVEL_OUTOF10: 0
PAINLEVEL_OUTOF10: 3
PAINLEVEL_OUTOF10: 0

## 2024-01-26 ASSESSMENT — PAIN DESCRIPTION - LOCATION
LOCATION: CHEST
LOCATION: CHEST
LOCATION: ABDOMEN

## 2024-01-26 ASSESSMENT — PAIN DESCRIPTION - ONSET: ONSET: ON-GOING

## 2024-01-26 ASSESSMENT — PAIN - FUNCTIONAL ASSESSMENT
PAIN_FUNCTIONAL_ASSESSMENT: ACTIVITIES ARE NOT PREVENTED
PAIN_FUNCTIONAL_ASSESSMENT: ACTIVITIES ARE NOT PREVENTED

## 2024-01-26 ASSESSMENT — PAIN DESCRIPTION - ORIENTATION
ORIENTATION: MID;LEFT
ORIENTATION: LEFT

## 2024-01-26 ASSESSMENT — PAIN DESCRIPTION - PAIN TYPE: TYPE: ACUTE PAIN;CHRONIC PAIN

## 2024-01-26 ASSESSMENT — PAIN DESCRIPTION - FREQUENCY: FREQUENCY: CONTINUOUS

## 2024-01-26 NOTE — PROGRESS NOTES
Utilize [facility] alcohol withdrawal scale (Based on David Modified Alcohol Withdrawal Scale).  Tabulate score based on classifications including Tremor, Sweating, Hallucination, Orientation, and Agitation.    Tremor: 0  Sweatin  Hallucinations: 0  Orientation: 0  Agitation: 0  Total Score: 1  Action perform as described below     Tremor:  No tremor is 0 points.  Tremor on movement is 1 point.  Tremor at rest is 2 points.  Sweating: No Sweat 0 points. Moist is 1 point.  Drenching sweats is 2 points.  Hallucinations: No present 0 points. Dissuadable is 1 point. Not dissuadable is 2 points.  Orientation: Oriented 0 points. Vague/detached 1 point. Disoriented/no contact 2 points.  Agitation: Calm 0 points.  Anxious 1 point. Panicky 2 points.    Check scale every 2 hours.  Discontinue scoring with 4 consecutive scorings of 0.  Scale 0: No phenobarbital given.  Re-assess every 60 minutes as needed.   Scale 1-3: Phenobarbital 130 mg IV over 3 minutes. Re-assess every 60 minutes as needed.  May administer every 60 minutes to a maximum dose of phenobarbital 1040 mg in 24 hours!  Scale 4-8: Phenobarbital 260 mg IV over 5 minutes.  Re-assess every 60 minutes as needed. May administer every 60 minutes to a maximum dose of phenobarbital 1040mg in 24 hours!  Scale 9-10: Transfer to ICU (if not already in ICU).  Administer 10mg/kg phenobarbital IV over 60 minutes.  Maximum dose phenobarbital is 1040mg in 24 hours!

## 2024-01-26 NOTE — PLAN OF CARE
Problem: Discharge Planning  Goal: Discharge to home or other facility with appropriate resources  Outcome: Progressing  Flowsheets (Taken 1/26/2024 1254)  Discharge to home or other facility with appropriate resources: Identify barriers to discharge with patient and caregiver  Note: Plans to go home at discharge and follow with Jitendra.     Problem: Pain  Goal: Verbalizes/displays adequate comfort level or baseline comfort level  Outcome: Progressing  Flowsheets (Taken 1/26/2024 1254)  Verbalizes/displays adequate comfort level or baseline comfort level: Encourage patient to monitor pain and request assistance  Note: Pt has pain voiced this shift at 3/10.  Pt pain goal is 0/10.  RN continues to deliver PRN pain medications as ordered.  RN tries to complete none invasive and none prescribed methods to help reduce pain like heating pad.  Pain re-assessed frequently. Bed alarm set after pain meds given.  Fall band intact.        Problem: Infection - Adult  Goal: Absence of infection at discharge  Outcome: Progressing  Flowsheets (Taken 1/26/2024 1254)  Absence of infection at discharge: Assess and monitor for signs and symptoms of infection  Note: IV ATB's continue     Problem: Metabolic/Fluid and Electrolytes - Adult  Goal: Electrolytes maintained within normal limits  Outcome: Progressing  Flowsheets (Taken 1/26/2024 1254)  Electrolytes maintained within normal limits: Monitor labs and assess patient for signs and symptoms of electrolyte imbalances  Note: Labs and VS WNL.     Problem: Genitourinary - Adult  Goal: Absence of urinary retention  Outcome: Completed  Flowsheets (Taken 1/26/2024 1254)  Absence of urinary retention: Assess patient’s ability to void and empty bladder  Note: Jesus any urinary symptoms.     Problem: Infection - Adult  Goal: Absence of infection during hospitalization  Outcome: Completed  Goal: Absence of fever/infection during anticipated neutropenic period  Outcome: Completed     Problem:

## 2024-01-26 NOTE — CARE COORDINATION
1/26/24, 2:11 PM EST    DISCHARGE ON GOING EVALUATION    [facility] day: 0  Location: 8B-30/030-A Reason for admit: Alcoholism (HCC) [F10.20]  Urinary tract infection without hematuria, site unspecified [N39.0]  Biliary calculus of other site without obstruction [K80.80]  Chest pain in adult [R07.9]  Acute alcoholic gastritis without hemorrhage [K29.20]  Alcohol dependence with withdrawal (HCC) [F10.239]   Procedure:   1/24 CTA Chest W WO: No pulmonary embolism. 2. No acute intrathoracic findings.  1/24 US Gallbladder: Cholelithiasis without evidence of acute cholecystitis. 2. Hyperechoic structure in the right hepatic lobe, likely a hemangioma. 3. Probable left hepatic cyst  Barriers to Discharge: Remain on phenobarb. IV rocephin for UTI. Thiamine. MVI. Folic acid.   PCP: Shola Davis, APRN - CNP  Readmission Risk Score: 5.9%  Patient Goals/Plan/Treatment Preferences: Plans to return home with family, denied needs.

## 2024-01-26 NOTE — PROGRESS NOTES
Hospitalist Progress Note    Patient:  Nancy Bruno      Unit/Bed:8B-30/030-A    YOB: 1990    MRN: [id number]       Acct: [id number]     PCP: Shola Davis APRN - CNP    Date of Admission: 1/24/2024    Assessment/Plan:    Acute on chronic chest pain, questioning of musculoskeletal in nature--2 troponins less than 6, urine toxicology screen negative, CTA chest did not reveal a pulmonary embolism, no acute intrathoracic finding; EKG is showing sinus rhythm heart rate 69; echo with EF 60 to 65%; add ibuprofen 400 mg every 8 hours  Acute alcohol withdrawal--alcohol level normal on admission, phenobarbital prophylactic protocol started on 1/24, on folic acid, multivitamin and thiamine; addiction services has been consulted and saw; patient wants to stop drinking  UTI (POA) with Klebsiella oxytoca--Rocephin 1/24~sensitive  Cholelithiasis without evidence of acute cholecystitis--ALT and AST slightly elevated however could be secondary to chronic alcohol use, bilirubin is normal, lipase is normal  Hyperechoic structure in the right hepatic lobe, likely a hemangioma  Probable left hepatic cyst   Elevated LFTs--possibly secondary to chronic alcohol use, much improved, monitor  Normocytic anemia--stable  Possible gastritis, possibly secondary to alcohol use--takes Prilosec 20 mg twice daily before meals at home  Anxiety/depression--treated      Expected discharge date: Pending clinical course    Disposition:    [x] Home       [] TCU       [] Rehab       [] Psych       [] SNF       [] Long Term Care Facility       [] Other-    Chief Complaint: Chest pain    Hospital Course: Per H&P done 1/24/2024: \"Amanda is a 33-year-old  female with a past medical history of anxiety and depression, chronic alcohol use, tobacco use who presents to [facility] ED today for the evaluation of sudden onset chest pain she experienced last night and has been persistent this morning.  Patient reports she was

## 2024-01-27 LAB
ALBUMIN SERPL BCG-MCNC: 3.8 G/DL (ref 3.5–5.1)
ALP SERPL-CCNC: 91 U/L (ref 38–126)
ALT SERPL W/O P-5'-P-CCNC: 39 U/L (ref 11–66)
ANION GAP SERPL CALC-SCNC: 11 MEQ/L (ref 8–16)
AST SERPL-CCNC: 16 U/L (ref 5–40)
BASOPHILS ABSOLUTE: 0 THOU/MM3 (ref 0–0.1)
BASOPHILS NFR BLD AUTO: 0.2 %
BILIRUB SERPL-MCNC: 0.3 MG/DL (ref 0.3–1.2)
BUN SERPL-MCNC: 6 MG/DL (ref 7–22)
CALCIUM SERPL-MCNC: 8.8 MG/DL (ref 8.5–10.5)
CHLORIDE SERPL-SCNC: 99 MEQ/L (ref 98–111)
CO2 SERPL-SCNC: 26 MEQ/L (ref 23–33)
CREAT SERPL-MCNC: 0.5 MG/DL (ref 0.4–1.2)
DEPRECATED RDW RBC AUTO: 49 FL (ref 35–45)
EOSINOPHIL NFR BLD AUTO: 0.7 %
EOSINOPHILS ABSOLUTE: 0.1 THOU/MM3 (ref 0–0.4)
ERYTHROCYTE [DISTWIDTH] IN BLOOD BY AUTOMATED COUNT: 14.2 % (ref 11.5–14.5)
GFR SERPL CREATININE-BSD FRML MDRD: > 60 ML/MIN/1.73M2
GLUCOSE SERPL-MCNC: 81 MG/DL (ref 70–108)
HCT VFR BLD AUTO: 35.6 % (ref 37–47)
HGB BLD-MCNC: 11.5 GM/DL (ref 12–16)
IMM GRANULOCYTES # BLD AUTO: 0.02 THOU/MM3 (ref 0–0.07)
IMM GRANULOCYTES NFR BLD AUTO: 0.2 %
LYMPHOCYTES ABSOLUTE: 1.3 THOU/MM3 (ref 1–4.8)
LYMPHOCYTES NFR BLD AUTO: 15.9 %
MAGNESIUM SERPL-MCNC: 1.8 MG/DL (ref 1.6–2.4)
MCH RBC QN AUTO: 30.3 PG (ref 26–33)
MCHC RBC AUTO-ENTMCNC: 32.3 GM/DL (ref 32.2–35.5)
MCV RBC AUTO: 93.7 FL (ref 81–99)
MONOCYTES ABSOLUTE: 0.4 THOU/MM3 (ref 0.4–1.3)
MONOCYTES NFR BLD AUTO: 4.9 %
NEUTROPHILS NFR BLD AUTO: 78.1 %
NRBC BLD AUTO-RTO: 0 /100 WBC
PLATELET # BLD AUTO: 158 THOU/MM3 (ref 130–400)
PMV BLD AUTO: 10.8 FL (ref 9.4–12.4)
POTASSIUM SERPL-SCNC: 3.4 MEQ/L (ref 3.5–5.2)
PROT SERPL-MCNC: 6.6 G/DL (ref 6.1–8)
RBC # BLD AUTO: 3.8 MILL/MM3 (ref 4.2–5.4)
REASON FOR REJECTION: NORMAL
REJECTED TEST: NORMAL
SEGMENTED NEUTROPHILS ABSOLUTE COUNT: 6.3 THOU/MM3 (ref 1.8–7.7)
SODIUM SERPL-SCNC: 136 MEQ/L (ref 135–145)
WBC # BLD AUTO: 8.1 THOU/MM3 (ref 4.8–10.8)

## 2024-01-27 PROCEDURE — 6370000000 HC RX 637 (ALT 250 FOR IP): Performed by: NURSE PRACTITIONER

## 2024-01-27 PROCEDURE — 6370000000 HC RX 637 (ALT 250 FOR IP)

## 2024-01-27 PROCEDURE — 6360000002 HC RX W HCPCS

## 2024-01-27 PROCEDURE — 1200000000 HC SEMI PRIVATE

## 2024-01-27 PROCEDURE — 83735 ASSAY OF MAGNESIUM: CPT

## 2024-01-27 PROCEDURE — 80053 COMPREHEN METABOLIC PANEL: CPT

## 2024-01-27 PROCEDURE — 36415 COLL VENOUS BLD VENIPUNCTURE: CPT

## 2024-01-27 PROCEDURE — 2580000003 HC RX 258: Performed by: NURSE PRACTITIONER

## 2024-01-27 PROCEDURE — 99233 SBSQ HOSP IP/OBS HIGH 50: CPT | Performed by: NURSE PRACTITIONER

## 2024-01-27 PROCEDURE — 85025 COMPLETE CBC W/AUTO DIFF WBC: CPT

## 2024-01-27 PROCEDURE — 2580000003 HC RX 258

## 2024-01-27 RX ORDER — SODIUM CHLORIDE 9 MG/ML
INJECTION, SOLUTION INTRAVENOUS CONTINUOUS
Status: DISCONTINUED | OUTPATIENT
Start: 2024-01-27 | End: 2024-02-02 | Stop reason: HOSPADM

## 2024-01-27 RX ORDER — CEFDINIR 300 MG/1
300 CAPSULE ORAL 2 TIMES DAILY
Qty: 8 CAPSULE | Refills: 0 | Status: SHIPPED | OUTPATIENT
Start: 2024-01-27 | End: 2024-01-31

## 2024-01-27 RX ORDER — 0.9 % SODIUM CHLORIDE 0.9 %
1000 INTRAVENOUS SOLUTION INTRAVENOUS ONCE
Status: COMPLETED | OUTPATIENT
Start: 2024-01-27 | End: 2024-01-27

## 2024-01-27 RX ORDER — MULTIVITAMIN WITH IRON
1 TABLET ORAL DAILY
Refills: 0 | COMMUNITY
Start: 2024-01-27

## 2024-01-27 RX ADMIN — POTASSIUM CHLORIDE 40 MEQ: 1500 TABLET, EXTENDED RELEASE ORAL at 09:52

## 2024-01-27 RX ADMIN — Medication 1 TABLET: at 09:53

## 2024-01-27 RX ADMIN — SODIUM CHLORIDE, PRESERVATIVE FREE 10 ML: 5 INJECTION INTRAVENOUS at 09:55

## 2024-01-27 RX ADMIN — HYDROXYZINE PAMOATE 25 MG: 25 CAPSULE ORAL at 19:58

## 2024-01-27 RX ADMIN — SODIUM CHLORIDE 1000 ML: 9 INJECTION, SOLUTION INTRAVENOUS at 10:39

## 2024-01-27 RX ADMIN — SODIUM CHLORIDE: 9 INJECTION, SOLUTION INTRAVENOUS at 13:37

## 2024-01-27 RX ADMIN — TRAZODONE HYDROCHLORIDE 50 MG: 50 TABLET ORAL at 19:58

## 2024-01-27 RX ADMIN — ACETAMINOPHEN 650 MG: 325 TABLET ORAL at 19:57

## 2024-01-27 RX ADMIN — CEFTRIAXONE SODIUM 1000 MG: 1 INJECTION, POWDER, FOR SOLUTION INTRAMUSCULAR; INTRAVENOUS at 15:55

## 2024-01-27 RX ADMIN — ENOXAPARIN SODIUM 40 MG: 100 INJECTION SUBCUTANEOUS at 19:57

## 2024-01-27 RX ADMIN — PANTOPRAZOLE SODIUM 40 MG: 40 TABLET, DELAYED RELEASE ORAL at 05:09

## 2024-01-27 RX ADMIN — PANTOPRAZOLE SODIUM 40 MG: 40 TABLET, DELAYED RELEASE ORAL at 15:54

## 2024-01-27 RX ADMIN — Medication 100 MG: at 09:52

## 2024-01-27 RX ADMIN — FOLIC ACID 1 MG: 1 TABLET ORAL at 09:53

## 2024-01-27 RX ADMIN — IBUPROFEN 400 MG: 200 TABLET, FILM COATED ORAL at 15:57

## 2024-01-27 RX ADMIN — HYDROXYZINE PAMOATE 25 MG: 25 CAPSULE ORAL at 04:20

## 2024-01-27 RX ADMIN — IBUPROFEN 400 MG: 200 TABLET, FILM COATED ORAL at 13:35

## 2024-01-27 RX ADMIN — ESCITALOPRAM OXALATE 20 MG: 20 TABLET ORAL at 09:53

## 2024-01-27 RX ADMIN — IBUPROFEN 400 MG: 200 TABLET, FILM COATED ORAL at 09:52

## 2024-01-27 ASSESSMENT — PAIN SCALES - GENERAL
PAINLEVEL_OUTOF10: 3
PAINLEVEL_OUTOF10: 0

## 2024-01-27 ASSESSMENT — PAIN - FUNCTIONAL ASSESSMENT: PAIN_FUNCTIONAL_ASSESSMENT: ACTIVITIES ARE NOT PREVENTED

## 2024-01-27 ASSESSMENT — PAIN DESCRIPTION - ORIENTATION: ORIENTATION: MID

## 2024-01-27 ASSESSMENT — PAIN DESCRIPTION - DESCRIPTORS: DESCRIPTORS: ACHING

## 2024-01-27 ASSESSMENT — PAIN DESCRIPTION - LOCATION: LOCATION: ABDOMEN

## 2024-01-27 NOTE — PROGRESS NOTES
Hospitalist Progress Note    Patient:  Nancy Bruno      Unit/Bed:8B-30/030-A    YOB: 1990    MRN: [id number]       Acct: [id number]     PCP: Shola Davis APRN - CNP    Date of Admission: 1/24/2024    Assessment/Plan:    Acute on chronic chest pain, questioning of musculoskeletal in nature--2 troponins less than 6, urine toxicology screen negative, CTA chest did not reveal a pulmonary embolism, no acute intrathoracic finding; EKG is showing sinus rhythm heart rate 69; echo with EF 60 to 65%; added ibuprofen 400 mg every 8 hours with relief  Orthostatic hypotension--questioning if dehydration a factor with #3, orthostatic vital signs were positive, will give 1 L 0.9 normal saline  Acute alcohol withdrawal--alcohol level normal on admission, phenobarbital prophylactic protocol started on 1/24-1/27, on folic acid, multivitamin and thiamine; addiction services has been consulted and saw; patient wants to stop drinking  UTI (POA) with Klebsiella oxytoca--Rocephin 1/24~sensitive  Cholelithiasis without evidence of acute cholecystitis--ALT and AST slightly elevated however could be secondary to chronic alcohol use, bilirubin is normal, lipase is normal; eating without issues  Hyperechoic structure in the right hepatic lobe, likely a hemangioma  Probable left hepatic cyst   Elevated LFTs--possibly secondary to chronic alcohol use, much improved, monitor  Normocytic anemia--stable  Possible gastritis, possibly secondary to alcohol use--takes Prilosec 20 mg twice daily before meals at home  Anxiety/depression--treated      Expected discharge date: Pending clinical course    Disposition:    [x] Home       [] TCU       [] Rehab       [] Psych       [] SNF       [] Long Term Care Facility       [] Other-    Chief Complaint: Chest pain    Hospital Course: Per H&P done 1/24/2024: \"Amanda is a 33-year-old  female with a past medical history of anxiety and depression, chronic alcohol  Medications    sodium chloride       Scheduled Medications    sodium chloride  1,000 mL IntraVENous Once    ibuprofen  400 mg Oral TID WC    escitalopram  20 mg Oral Daily    pantoprazole  40 mg Oral BID AC    enoxaparin  40 mg SubCUTAneous Q24H    cefTRIAXone (ROCEPHIN) IV  1,000 mg IntraVENous Q24H    PHENobarbital  32.4 mg Oral BID    sodium chloride flush  5-40 mL IntraVENous 2 times per day    thiamine  100 mg Oral Daily    multivitamin  1 tablet Oral Daily    folic acid  1 mg Oral Daily     PRN Meds: hydrOXYzine pamoate, traZODone, potassium chloride **OR** potassium alternative oral replacement **OR** potassium chloride, magnesium sulfate, ondansetron **OR** ondansetron, polyethylene glycol, acetaminophen **OR** acetaminophen, sodium chloride flush, sodium chloride      Intake/Output Summary (Last 24 hours) at 1/27/2024 1125  Last data filed at 1/27/2024 0955  Gross per 24 hour   Intake 400 ml   Output 0 ml   Net 400 ml         Diet:  ADULT DIET; Regular; GI [place] (GERD/Peptic Ulcer)    Exam:  BP (!) 91/48   Pulse 66   Temp 97.9 °F (36.6 °C) (Oral)   Resp 16   Ht 1.549 m (5' 1\")   Wt 60.6 kg (133 lb 9.6 oz)   SpO2 96%   BMI 25.24 kg/m²     General appearance: No apparent distress, appears stated age and cooperative.  HEENT: Pupils equal, round, and reactive to light. Conjunctivae/corneas clear.  Neck: Supple, with full range of motion. No jugular venous distention. Trachea midline.  Respiratory:  Normal respiratory effort. Clear to auscultation, bilaterally without Rales/Wheezes/Rhonchi.  Cardiovascular: Regular rate and rhythm with normal S1/S2 without murmurs, rubs or gallops.  Left anterior chest wall tender to palpation  Abdomen: Soft, non-tender, non-distended with normal bowel sounds.  Musculoskeletal: passive and active ROM x 4 extremities.  Skin: Skin color, texture, turgor normal.    Neurologic:  Neurovascularly intact without any focal sensory/motor deficits. Cranial nerves: II-XII intact,

## 2024-01-27 NOTE — PLAN OF CARE
Problem: Discharge Planning  Goal: Discharge to home or other facility with appropriate resources  Outcome: Progressing  Flowsheets (Taken 1/27/2024 1720)  Discharge to home or other facility with appropriate resources: Identify barriers to discharge with patient and caregiver  Note: Plans to go home at discharge.     Problem: Pain  Goal: Verbalizes/displays adequate comfort level or baseline comfort level  Outcome: Progressing  Flowsheets (Taken 1/27/2024 1720)  Verbalizes/displays adequate comfort level or baseline comfort level: Encourage patient to monitor pain and request assistance  Note: Pt has pain voiced this shift at 3/10.  Pt pain goal is 0/10.  RN continues to deliver PRN pain medications as ordered.  RN tries to complete none invasive and none prescribed methods to help reduce pain like rest.  Pain re-assessed frequently. Bed alarm set after pain meds given.  Fall band intact.        Problem: Infection - Adult  Goal: Absence of infection at discharge  Outcome: Progressing  Flowsheets (Taken 1/27/2024 1720)  Absence of infection at discharge: Assess and monitor for signs and symptoms of infection  Note: IV ATB's continues.     Problem: Metabolic/Fluid and Electrolytes - Adult  Goal: Electrolytes maintained within normal limits  Outcome: Progressing  Flowsheets (Taken 1/27/2024 1720)  Electrolytes maintained within normal limits: Monitor labs and assess patient for signs and symptoms of electrolyte imbalances  Note: BP low, bolused with saline, continuous drip ordered. BP improving.   Care plan reviewed with patient.  Patient verbalize understanding of the plan of care and contribute to goal setting.

## 2024-01-28 ENCOUNTER — APPOINTMENT (OUTPATIENT)
Dept: GENERAL RADIOLOGY | Age: 34
DRG: 263 | End: 2024-01-28
Payer: COMMERCIAL

## 2024-01-28 LAB
ALBUMIN SERPL BCG-MCNC: 3.4 G/DL (ref 3.5–5.1)
ALP SERPL-CCNC: 80 U/L (ref 38–126)
ALT SERPL W/O P-5'-P-CCNC: 34 U/L (ref 11–66)
ANION GAP SERPL CALC-SCNC: 11 MEQ/L (ref 8–16)
AST SERPL-CCNC: 20 U/L (ref 5–40)
BILIRUB SERPL-MCNC: 0.2 MG/DL (ref 0.3–1.2)
BUN SERPL-MCNC: 4 MG/DL (ref 7–22)
CALCIUM SERPL-MCNC: 8 MG/DL (ref 8.5–10.5)
CHLORIDE SERPL-SCNC: 103 MEQ/L (ref 98–111)
CO2 SERPL-SCNC: 22 MEQ/L (ref 23–33)
CREAT SERPL-MCNC: 0.6 MG/DL (ref 0.4–1.2)
DEPRECATED RDW RBC AUTO: 49.7 FL (ref 35–45)
ERYTHROCYTE [DISTWIDTH] IN BLOOD BY AUTOMATED COUNT: 14.6 % (ref 11.5–14.5)
FLUAV RNA RESP QL NAA+PROBE: NOT DETECTED
FLUBV RNA RESP QL NAA+PROBE: DETECTED
GFR SERPL CREATININE-BSD FRML MDRD: > 60 ML/MIN/1.73M2
GLUCOSE SERPL-MCNC: 97 MG/DL (ref 70–108)
HCT VFR BLD AUTO: 33.6 % (ref 37–47)
HGB BLD-MCNC: 11 GM/DL (ref 12–16)
LACTATE SERPL-SCNC: 1.2 MMOL/L (ref 0.5–2)
MCH RBC QN AUTO: 30.6 PG (ref 26–33)
MCHC RBC AUTO-ENTMCNC: 32.7 GM/DL (ref 32.2–35.5)
MCV RBC AUTO: 93.6 FL (ref 81–99)
PLATELET # BLD AUTO: 148 THOU/MM3 (ref 130–400)
PMV BLD AUTO: 10.3 FL (ref 9.4–12.4)
POTASSIUM SERPL-SCNC: 3.6 MEQ/L (ref 3.5–5.2)
PROCALCITONIN SERPL IA-MCNC: 0.28 NG/ML (ref 0.01–0.09)
PROT SERPL-MCNC: 6.4 G/DL (ref 6.1–8)
RBC # BLD AUTO: 3.59 MILL/MM3 (ref 4.2–5.4)
SARS-COV-2 RNA RESP QL NAA+PROBE: NOT DETECTED
SODIUM SERPL-SCNC: 136 MEQ/L (ref 135–145)
WBC # BLD AUTO: 11.7 THOU/MM3 (ref 4.8–10.8)

## 2024-01-28 PROCEDURE — 71046 X-RAY EXAM CHEST 2 VIEWS: CPT

## 2024-01-28 PROCEDURE — 2580000003 HC RX 258

## 2024-01-28 PROCEDURE — 1200000000 HC SEMI PRIVATE

## 2024-01-28 PROCEDURE — 6370000000 HC RX 637 (ALT 250 FOR IP): Performed by: NURSE PRACTITIONER

## 2024-01-28 PROCEDURE — 2580000003 HC RX 258: Performed by: NURSE PRACTITIONER

## 2024-01-28 PROCEDURE — 84145 PROCALCITONIN (PCT): CPT

## 2024-01-28 PROCEDURE — 87040 BLOOD CULTURE FOR BACTERIA: CPT

## 2024-01-28 PROCEDURE — 85027 COMPLETE CBC AUTOMATED: CPT

## 2024-01-28 PROCEDURE — 6360000002 HC RX W HCPCS

## 2024-01-28 PROCEDURE — 87636 SARSCOV2 & INF A&B AMP PRB: CPT

## 2024-01-28 PROCEDURE — 83605 ASSAY OF LACTIC ACID: CPT

## 2024-01-28 PROCEDURE — 36415 COLL VENOUS BLD VENIPUNCTURE: CPT

## 2024-01-28 PROCEDURE — 80053 COMPREHEN METABOLIC PANEL: CPT

## 2024-01-28 PROCEDURE — 99233 SBSQ HOSP IP/OBS HIGH 50: CPT | Performed by: NURSE PRACTITIONER

## 2024-01-28 PROCEDURE — 94669 MECHANICAL CHEST WALL OSCILL: CPT

## 2024-01-28 PROCEDURE — 6370000000 HC RX 637 (ALT 250 FOR IP)

## 2024-01-28 RX ORDER — AZITHROMYCIN 250 MG/1
500 TABLET, FILM COATED ORAL DAILY
Status: COMPLETED | OUTPATIENT
Start: 2024-01-28 | End: 2024-01-28

## 2024-01-28 RX ORDER — OSELTAMIVIR PHOSPHATE 75 MG/1
75 CAPSULE ORAL 2 TIMES DAILY
Status: COMPLETED | OUTPATIENT
Start: 2024-01-28 | End: 2024-02-01

## 2024-01-28 RX ORDER — AZITHROMYCIN 250 MG/1
250 TABLET, FILM COATED ORAL DAILY
Status: DISCONTINUED | OUTPATIENT
Start: 2024-01-29 | End: 2024-01-28

## 2024-01-28 RX ADMIN — HYDROXYZINE PAMOATE 25 MG: 25 CAPSULE ORAL at 21:51

## 2024-01-28 RX ADMIN — IBUPROFEN 400 MG: 200 TABLET, FILM COATED ORAL at 16:32

## 2024-01-28 RX ADMIN — Medication 100 MG: at 08:39

## 2024-01-28 RX ADMIN — ACETAMINOPHEN 650 MG: 325 TABLET ORAL at 16:32

## 2024-01-28 RX ADMIN — IBUPROFEN 400 MG: 200 TABLET, FILM COATED ORAL at 08:38

## 2024-01-28 RX ADMIN — CEFTRIAXONE SODIUM 1000 MG: 1 INJECTION, POWDER, FOR SOLUTION INTRAMUSCULAR; INTRAVENOUS at 16:37

## 2024-01-28 RX ADMIN — PANTOPRAZOLE SODIUM 40 MG: 40 TABLET, DELAYED RELEASE ORAL at 16:32

## 2024-01-28 RX ADMIN — SODIUM CHLORIDE: 9 INJECTION, SOLUTION INTRAVENOUS at 00:46

## 2024-01-28 RX ADMIN — ENOXAPARIN SODIUM 40 MG: 100 INJECTION SUBCUTANEOUS at 21:51

## 2024-01-28 RX ADMIN — OSELTAMIVIR PHOSPHATE 75 MG: 75 CAPSULE ORAL at 21:51

## 2024-01-28 RX ADMIN — OSELTAMIVIR PHOSPHATE 75 MG: 75 CAPSULE ORAL at 14:59

## 2024-01-28 RX ADMIN — ESCITALOPRAM OXALATE 20 MG: 20 TABLET ORAL at 08:39

## 2024-01-28 RX ADMIN — TRAZODONE HYDROCHLORIDE 50 MG: 50 TABLET ORAL at 21:51

## 2024-01-28 RX ADMIN — ACETAMINOPHEN 650 MG: 325 TABLET ORAL at 21:51

## 2024-01-28 RX ADMIN — SODIUM CHLORIDE: 9 INJECTION, SOLUTION INTRAVENOUS at 21:53

## 2024-01-28 RX ADMIN — PANTOPRAZOLE SODIUM 40 MG: 40 TABLET, DELAYED RELEASE ORAL at 08:38

## 2024-01-28 RX ADMIN — Medication 1 TABLET: at 08:38

## 2024-01-28 RX ADMIN — ACETAMINOPHEN 650 MG: 325 TABLET ORAL at 08:38

## 2024-01-28 RX ADMIN — IBUPROFEN 400 MG: 200 TABLET, FILM COATED ORAL at 11:29

## 2024-01-28 RX ADMIN — FOLIC ACID 1 MG: 1 TABLET ORAL at 08:39

## 2024-01-28 RX ADMIN — AZITHROMYCIN DIHYDRATE 500 MG: 250 TABLET ORAL at 11:24

## 2024-01-28 ASSESSMENT — PAIN DESCRIPTION - ORIENTATION
ORIENTATION: LOWER;RIGHT
ORIENTATION: RIGHT;LOWER

## 2024-01-28 ASSESSMENT — PAIN SCALES - GENERAL
PAINLEVEL_OUTOF10: 0
PAINLEVEL_OUTOF10: 8
PAINLEVEL_OUTOF10: 8
PAINLEVEL_OUTOF10: 6
PAINLEVEL_OUTOF10: 8

## 2024-01-28 ASSESSMENT — PAIN DESCRIPTION - DESCRIPTORS
DESCRIPTORS: CRAMPING
DESCRIPTORS: ACHING
DESCRIPTORS: CRAMPING
DESCRIPTORS: ACHING
DESCRIPTORS: CRAMPING

## 2024-01-28 ASSESSMENT — PAIN DESCRIPTION - LOCATION
LOCATION: ABDOMEN

## 2024-01-28 ASSESSMENT — PAIN - FUNCTIONAL ASSESSMENT: PAIN_FUNCTIONAL_ASSESSMENT: ACTIVITIES ARE NOT PREVENTED

## 2024-01-28 NOTE — PLAN OF CARE
Problem: Discharge Planning  Goal: Discharge to home or other facility with appropriate resources  Outcome: Progressing     Problem: Pain  Goal: Verbalizes/displays adequate comfort level or baseline comfort level  Outcome: Progressing     Problem: Infection - Adult  Goal: Absence of infection at discharge  Outcome: Progressing     Problem: Metabolic/Fluid and Electrolytes - Adult  Goal: Electrolytes maintained within normal limits  Outcome: Progressing

## 2024-01-28 NOTE — PROGRESS NOTES
gallbladder. There is no gallbladder wall thickening or pericholecystic fluid. The common bile duct is normal, measuring 0.3 cm. There is no intrahepatic biliary ductal dilation. An avascular anechoic structure in the left hepatic lobe measures 0.5 cm. A hyperechoic structure in the right hepatic lobe measures 1.0 cm.     1. Cholelithiasis without evidence of acute cholecystitis. 2. Hyperechoic structure in the right hepatic lobe, likely a hemangioma. 3. Probable left hepatic cyst. Final report electronically signed by Dr. Finesse Chavira on 1/24/2024 3:32 PM    CTA CHEST W WO CONTRAST    Result Date: 1/24/2024  PROCEDURE: CTA CHEST W WO CONTRAST CLINICAL INFORMATION: Chest pain TECHNIQUE: CTA of the chest was performed following administration of 80 mL Isovue-370 intravenous contrast. Axial images as well as coronal and sagittal MIP reconstructions were obtained. All CT scans at this facility use dose modulation, iterative reconstruction, and/or weight-based dosing when appropriate to reduce radiation dose to as low as reasonably achievable. COMPARISON: None FINDINGS: There are no filling defects or lack of contrast opacification in the visualized pulmonary arteries to suggest thromboembolism. Cardiac size is normal. There is no pleural or pericardial effusion. Lungs are clear. There is no mediastinal, hilar or axillary lymphadenopathy. No aggressive osseous lesions are identified in the thoracic spine.     1. No pulmonary embolism. 2. No acute intrathoracic findings. Final report electronically signed by Dr. Finesse Chavira on 1/24/2024 3:20 PM      DVT prophylaxis: [x] Lovenox                                 [] SCDs                                 [] SQ Heparin                                 [x] Encourage ambulation           [] Already on Anticoagulation     Code Status: Full Code    PT/OT Eval Status: Ambulate    Tele:   [] Yes              [x] no    Active Hospital Problems    Diagnosis Date Noted    Alcohol  dependence with withdrawal (HCC) [F10.239] 01/26/2024    Chest pain in adult [R07.9] 01/24/2024       Electronically signed by SHIRA Lewis CNP on 1/28/2024 at 7:09 AM

## 2024-01-29 ENCOUNTER — APPOINTMENT (OUTPATIENT)
Dept: GENERAL RADIOLOGY | Age: 34
DRG: 263 | End: 2024-01-29
Payer: COMMERCIAL

## 2024-01-29 ENCOUNTER — APPOINTMENT (OUTPATIENT)
Dept: NUCLEAR MEDICINE | Age: 34
DRG: 263 | End: 2024-01-29
Payer: COMMERCIAL

## 2024-01-29 PROBLEM — F10.20 ALCOHOLISM (HCC): Status: ACTIVE | Noted: 2024-01-26

## 2024-01-29 PROBLEM — K80.20 GALLSTONES: Status: ACTIVE | Noted: 2024-01-29

## 2024-01-29 LAB
EKG ATRIAL RATE: 63 BPM
EKG P AXIS: 40 DEGREES
EKG P-R INTERVAL: 138 MS
EKG Q-T INTERVAL: 430 MS
EKG QRS DURATION: 82 MS
EKG QTC CALCULATION (BAZETT): 440 MS
EKG R AXIS: 73 DEGREES
EKG T AXIS: 43 DEGREES
EKG VENTRICULAR RATE: 63 BPM
HCG INTACT+B SERPL-ACNC: < 1 MIU/ML (ref 0–5)

## 2024-01-29 PROCEDURE — 6370000000 HC RX 637 (ALT 250 FOR IP): Performed by: NURSE PRACTITIONER

## 2024-01-29 PROCEDURE — 6360000002 HC RX W HCPCS: Performed by: RADIOLOGY

## 2024-01-29 PROCEDURE — 84702 CHORIONIC GONADOTROPIN TEST: CPT

## 2024-01-29 PROCEDURE — 36415 COLL VENOUS BLD VENIPUNCTURE: CPT

## 2024-01-29 PROCEDURE — 6370000000 HC RX 637 (ALT 250 FOR IP)

## 2024-01-29 PROCEDURE — 2580000003 HC RX 258: Performed by: NURSE PRACTITIONER

## 2024-01-29 PROCEDURE — 78226 HEPATOBILIARY SYSTEM IMAGING: CPT

## 2024-01-29 PROCEDURE — 1200000000 HC SEMI PRIVATE

## 2024-01-29 PROCEDURE — 99233 SBSQ HOSP IP/OBS HIGH 50: CPT | Performed by: NURSE PRACTITIONER

## 2024-01-29 PROCEDURE — 99222 1ST HOSP IP/OBS MODERATE 55: CPT | Performed by: SURGERY

## 2024-01-29 PROCEDURE — 94669 MECHANICAL CHEST WALL OSCILL: CPT

## 2024-01-29 PROCEDURE — APPSS30 APP SPLIT SHARED TIME 16-30 MINUTES: Performed by: NURSE PRACTITIONER

## 2024-01-29 PROCEDURE — 2580000003 HC RX 258

## 2024-01-29 PROCEDURE — 6360000002 HC RX W HCPCS

## 2024-01-29 PROCEDURE — A9537 TC99M MEBROFENIN: HCPCS | Performed by: NURSE PRACTITIONER

## 2024-01-29 PROCEDURE — 6360000002 HC RX W HCPCS: Performed by: NURSE PRACTITIONER

## 2024-01-29 PROCEDURE — 74018 RADEX ABDOMEN 1 VIEW: CPT

## 2024-01-29 PROCEDURE — 3430000000 HC RX DIAGNOSTIC RADIOPHARMACEUTICAL: Performed by: NURSE PRACTITIONER

## 2024-01-29 PROCEDURE — 94761 N-INVAS EAR/PLS OXIMETRY MLT: CPT

## 2024-01-29 RX ORDER — POLYETHYLENE GLYCOL 3350 17 G/17G
17 POWDER, FOR SOLUTION ORAL DAILY
Status: DISCONTINUED | OUTPATIENT
Start: 2024-01-29 | End: 2024-02-02 | Stop reason: HOSPADM

## 2024-01-29 RX ORDER — MORPHINE SULFATE 4 MG/ML
4 INJECTION, SOLUTION INTRAMUSCULAR; INTRAVENOUS ONCE
Status: COMPLETED | OUTPATIENT
Start: 2024-01-29 | End: 2024-01-29

## 2024-01-29 RX ORDER — DOCUSATE SODIUM 100 MG/1
100 CAPSULE, LIQUID FILLED ORAL 2 TIMES DAILY
Status: DISCONTINUED | OUTPATIENT
Start: 2024-01-29 | End: 2024-02-02 | Stop reason: HOSPADM

## 2024-01-29 RX ADMIN — ACETAMINOPHEN 650 MG: 325 TABLET ORAL at 04:59

## 2024-01-29 RX ADMIN — OSELTAMIVIR PHOSPHATE 75 MG: 75 CAPSULE ORAL at 21:51

## 2024-01-29 RX ADMIN — MORPHINE SULFATE 4 MG: 4 INJECTION, SOLUTION INTRAMUSCULAR; INTRAVENOUS at 15:33

## 2024-01-29 RX ADMIN — PIPERACILLIN AND TAZOBACTAM 3375 MG: 3; .375 INJECTION, POWDER, FOR SOLUTION INTRAVENOUS at 16:57

## 2024-01-29 RX ADMIN — OSELTAMIVIR PHOSPHATE 75 MG: 75 CAPSULE ORAL at 08:44

## 2024-01-29 RX ADMIN — ENOXAPARIN SODIUM 40 MG: 100 INJECTION SUBCUTANEOUS at 21:51

## 2024-01-29 RX ADMIN — Medication 100 MG: at 08:44

## 2024-01-29 RX ADMIN — CEFTRIAXONE SODIUM 1000 MG: 1 INJECTION, POWDER, FOR SOLUTION INTRAMUSCULAR; INTRAVENOUS at 16:20

## 2024-01-29 RX ADMIN — PIPERACILLIN AND TAZOBACTAM 4500 MG: 4; .5 INJECTION, POWDER, FOR SOLUTION INTRAVENOUS at 11:08

## 2024-01-29 RX ADMIN — IBUPROFEN 400 MG: 200 TABLET, FILM COATED ORAL at 16:53

## 2024-01-29 RX ADMIN — PANTOPRAZOLE SODIUM 40 MG: 40 TABLET, DELAYED RELEASE ORAL at 04:59

## 2024-01-29 RX ADMIN — FOLIC ACID 1 MG: 1 TABLET ORAL at 08:44

## 2024-01-29 RX ADMIN — ESCITALOPRAM OXALATE 20 MG: 20 TABLET ORAL at 08:44

## 2024-01-29 RX ADMIN — Medication 1 TABLET: at 08:44

## 2024-01-29 RX ADMIN — SODIUM CHLORIDE: 9 INJECTION, SOLUTION INTRAVENOUS at 05:00

## 2024-01-29 RX ADMIN — Medication 5.5 MILLICURIE: at 13:50

## 2024-01-29 RX ADMIN — DOCUSATE SODIUM 100 MG: 100 CAPSULE, LIQUID FILLED ORAL at 21:51

## 2024-01-29 RX ADMIN — PANTOPRAZOLE SODIUM 40 MG: 40 TABLET, DELAYED RELEASE ORAL at 16:54

## 2024-01-29 RX ADMIN — IBUPROFEN 400 MG: 200 TABLET, FILM COATED ORAL at 08:43

## 2024-01-29 RX ADMIN — TRAZODONE HYDROCHLORIDE 50 MG: 50 TABLET ORAL at 22:05

## 2024-01-29 ASSESSMENT — PAIN DESCRIPTION - ORIENTATION
ORIENTATION: RIGHT
ORIENTATION: RIGHT;LOWER
ORIENTATION: RIGHT
ORIENTATION: RIGHT;LOWER
ORIENTATION: RIGHT

## 2024-01-29 ASSESSMENT — PAIN SCALES - GENERAL
PAINLEVEL_OUTOF10: 0
PAINLEVEL_OUTOF10: 5
PAINLEVEL_OUTOF10: 7
PAINLEVEL_OUTOF10: 8
PAINLEVEL_OUTOF10: 7
PAINLEVEL_OUTOF10: 7

## 2024-01-29 ASSESSMENT — PAIN DESCRIPTION - DESCRIPTORS
DESCRIPTORS: ACHING;DISCOMFORT
DESCRIPTORS: ACHING
DESCRIPTORS: ACHING;DISCOMFORT

## 2024-01-29 ASSESSMENT — PAIN - FUNCTIONAL ASSESSMENT
PAIN_FUNCTIONAL_ASSESSMENT: ACTIVITIES ARE NOT PREVENTED

## 2024-01-29 ASSESSMENT — PAIN DESCRIPTION - LOCATION
LOCATION: ABDOMEN

## 2024-01-29 ASSESSMENT — PAIN DESCRIPTION - PAIN TYPE: TYPE: ACUTE PAIN

## 2024-01-29 ASSESSMENT — PAIN DESCRIPTION - FREQUENCY: FREQUENCY: INTERMITTENT

## 2024-01-29 NOTE — PROGRESS NOTES
Hospitalist Progress Note    Patient:  Nancy Bruno      Unit/Bed:8B-30/030-A    YOB: 1990    MRN: [id number]       Acct: [id number]     PCP: Shola Davis APRN - CNP    Date of Admission: 1/24/2024    Assessment/Plan:    Acute on chronic chest pain, questioning of musculoskeletal in nature--2 troponins less than 6, urine toxicology screen negative, CTA chest did not reveal a pulmonary embolism, no acute intrathoracic finding; EKG is showing sinus rhythm heart rate 69; echo with EF 60 to 65%; on 1/26 added ibuprofen 400 mg every 8 hours with relief  Influenza B, not present on admission--Tamiflu 1/28; exhibited fever and body aches  Abdominal pain--pregnancy test negative, KUB did show a mild ileus so added MiraLAX and Colace, consulted surgery secondary to #9 and a HIDA scan has been ordered  Fever--afebrile  past 24 hours, is not tachycardic, on room air; likely secondary to #2  Hypokalemia--replaced per protocol, magnesium at 1.8  Mild ileus--discussed with surgery, will add MiraLAX and Colace, patient needs to get up and ambulate around  Orthostatic hypotension--questioning if dehydration a factor with #3, orthostatic vital signs improved to normal  Acute alcohol withdrawal--alcohol level normal on admission, phenobarbital prophylactic protocol started on 1/24-1/27, on folic acid, multivitamin and thiamine; addiction services has been consulted and saw; patient wants to stop drinking  UTI (POA) with Klebsiella oxytoca--Rocephin 1/24~sensitive  Cholelithiasis without evidence of acute cholecystitis--ALT and AST slightly elevated however could be secondary to chronic alcohol use, bilirubin is normal, lipase is normal; was eating without issues however now complaining of right upper quadrant/epigastric pain and nausea with eating so surgery was consulted and ordered a HIDA scan  Hyperechoic structure in the right hepatic lobe, likely a hemangioma  Probable left hepatic cyst    Elevated LFTs--possibly secondary to chronic alcohol use, resulted to normal, monitor  Normocytic anemia--stable  Possible gastritis, possibly secondary to alcohol use--takes Prilosec 20 mg twice daily before meals at home  Anxiety/depression--treated      Expected discharge date: Pending clinical course    Disposition:    [x] Home       [] TCU       [] Rehab       [] Psych       [] SNF       [] Long Term Care Facility       [] Other-    Chief Complaint: Chest pain    Hospital Course: Per H&P done 1/24/2024: \"Amanda is a 33-year-old  female with a past medical history of anxiety and depression, chronic alcohol use, tobacco use who presents to [facility] ED today for the evaluation of sudden onset chest pain she experienced last night and has been persistent this morning.  Patient reports she was out with a friend, sitting in a car when she had sudden onset substernal chest pain described as \"a ton of bricks sitting on my chest\" that lasted approximately 30 minutes.  Patient reports she had associated right and left arm numbness, pain in her shoulder blades bilaterally at this time as well.  Patient states she has never experienced symptoms like this before.  She woke up this morning and had discomfort in her chest which motivated her to seek evaluation in the ED.  Patient reports a history of watery diarrhea intermittently for 2 weeks, and nausea.  Patient reports she drinks alcohol daily, 2-3 beers per day.  Last night she reports drinking vodka as well.  Has experienced withdrawal symptoms including tremors, irritation, sweating and blackouts related to intoxication in the past; currently experiencing nausea, tremors, sweating, anxiety.  Denies history of seizures related to alcohol use or withdrawal.  Patient reports her chest pressure is mild at this time, however still present.  She denies shortness of breath, abdominal pain, nausea, vomiting at this time, fever, chills.  Patient denies illicit drug use,

## 2024-01-29 NOTE — CARE COORDINATION
1/29/24, 3:01 PM EST    DISCHARGE ON GOING EVALUATION    Nancy SALINA [facility] day: 3  Location: 8B-30/030-A Reason for admit: Alcoholism (HCC) [F10.20]  Urinary tract infection without hematuria, site unspecified [N39.0]  Biliary calculus of other site without obstruction [K80.80]  Chest pain in adult [R07.9]  Acute alcoholic gastritis without hemorrhage [K29.20]  Alcohol dependence with withdrawal (HCC) [F10.239]     Procedure:   1/24 CTA Chest W WO: No pulmonary embolism. 2. No acute intrathoracic findings.  1/24 US Gallbladder: Cholelithiasis without evidence of acute cholecystitis. 2. Hyperechoic structure in the right hepatic lobe, likely a hemangioma. 3. Probable left hepatic cyst  1/28 + influenza   1/29 HIDA done, awaiting results.     Barriers to Discharge: Hospitalist and gen surgery following. NPO. IVF. IV rocephin.     PCP: Shola Davis, APRN - CNP  Readmission Risk Score: 5.7%    Patient Goals/Plan/Treatment Preferences:  Plans to return home with family, denied needs.

## 2024-01-29 NOTE — CONSULTS
I have independently performed an evaluation on Nancy . I have reviewed the above documentation completed by the NP, Jaki Fernández  Italicized font, if present, represents changes to the note made by me.    Time spent with patient 35minutes.  Time could have been discontiguous.  Time does not include procedures.  Time does include my direct assessment of the patient and coordination of care.  Time represents more than 50% of the time involved with patient care by the surgical/tauma team.        33-year-old female who presented with severe chest pain, thought she was having heart attack.  Was found to have influenza B.  Shortness of breath and chest pain are getting better but now having right upper quadrant pain.  Patient has had similar attacks in the past is not as severe.  Patient found to have gallstones.  I had a long discussion with patient that she does seem to have biliary symptoms.  If she has acute cholecystitis will need to do cholecystectomy this admission however if patient is symptomatic cholelithiasis would benefit from patient recovering from her acute stress of the influenza B.  Will get a HIDA scan to evaluate.  Recommend n.p.o. versus nonfat diet, would not regular recommend regular diet to stimulate gallbladder contraction at this time.  IV fluids antibiotics and pain control    Electronically signed by Sobia Gibson MD on 1/29/2024 at 3:28 PM    [facility]  General Surgery Consultation - SHIRA Lombardi CNP  On behalf of Dr. Sobia Gibson    Pt Name: Nancy Bruno  MRN: [id number]  YOB: 1990  Date of evaluation: 1/29/2024  Primary Care Physician: Shola Davis APRN - CNP  Patient evaluated at the request of Viola Willis CNP  Reason for evaluation: gallstones   IMPRESSIONS:   Abdominal pain, cramping, nausea  Gallstones - US  KUB mild ileus   + influenza B   has a past medical history of Abnormal Pap smear of cervix, Anemia, Chlamydia, and  wheezing  Cardiac Denies any chest pain, palpitations, claudication or edema  GI Positive for nausea and abdominal pain, cramping   Denies any melena, hematochezia, hematemesis or pyrosis   Denies any frequency, urgency, hesitancy or incontinence  Endocrine Denies any history of diabetes or thyroid disease  Neuro Denies any focal motor or sensory deficits  SUBJECTIVE:   CURRENT VITALS:  height is 1.549 m (5' 1\") and weight is 60.6 kg (133 lb 9.6 oz). Her oral temperature is 98.1 °F (36.7 °C). Her blood pressure is 86/48 (abnormal) and her pulse is 59. Her respiration is 15 and oxygen saturation is 100%.  Body mass index is 25.24 kg/m².  Temperature Range (24h):Temp: 98.1 °F (36.7 °C) Temp  Av.4 °F (36.9 °C)  Min: 98.1 °F (36.7 °C)  Max: 98.8 °F (37.1 °C)  BP Range (24h): Systolic (24hrs), Av , Min:84 , Max:93     Diastolic (24hrs), Av, Min:45, Max:51    Pulse Range (24h): Pulse  Av.3  Min: 59  Max: 75  Respiration Range (24h): Resp  Avg: 15.6  Min: 15  Max: 16  Current Pulse Ox (24h):  SpO2: 100 %  Pulse Ox Range (24h):  SpO2  Av.2 %  Min: 97 %  Max: 100 %  Oxygen Amount and Delivery:    CONSTITUTIONAL: Alert and oriented times 3, no acute distress and cooperative to examination with proper mood and affect.  SKIN: Skin color, texture, turgor normal. No rashes or lesions.  HEENT: Head is normocephalic, atraumatic.   NECK: Supple, symmetrical, trachea midline, no adenopathy, thyroid symmetric, not enlarged and no tenderness, skin normal.  CHEST/LUNGS:  normal respiratory rate and rhythm, lungs clear to auscultation without wheezes, rales or rhonchi. No accessory muscle use.   CARDIOVASCULAR: Heart regular rate and rhythm Normal S1 and S2.   ABDOMEN: Normal shape.Normal bowel sounds.  Soft, nondistended, no masses or organomegaly. Tenderness RUQ  RECTAL: deferred, not clinically indicated  NEUROLOGIC: There are no focalizing motor or sensory deficits. CN II-XII are grossly intact..

## 2024-01-30 ENCOUNTER — TELEPHONE (OUTPATIENT)
Dept: SURGERY | Age: 34
End: 2024-01-30

## 2024-01-30 DIAGNOSIS — R61 GENERALIZED HYPERHIDROSIS: ICD-10-CM

## 2024-01-30 LAB
DEPRECATED RDW RBC AUTO: 48.4 FL (ref 35–45)
ERYTHROCYTE [DISTWIDTH] IN BLOOD BY AUTOMATED COUNT: 14.3 % (ref 11.5–14.5)
HCT VFR BLD AUTO: 30.1 % (ref 37–47)
HGB BLD-MCNC: 10 GM/DL (ref 12–16)
MCH RBC QN AUTO: 30.8 PG (ref 26–33)
MCHC RBC AUTO-ENTMCNC: 33.2 GM/DL (ref 32.2–35.5)
MCV RBC AUTO: 92.6 FL (ref 81–99)
PLATELET # BLD AUTO: 145 THOU/MM3 (ref 130–400)
PMV BLD AUTO: 10.9 FL (ref 9.4–12.4)
RBC # BLD AUTO: 3.25 MILL/MM3 (ref 4.2–5.4)
WBC # BLD AUTO: 4.9 THOU/MM3 (ref 4.8–10.8)

## 2024-01-30 PROCEDURE — 6370000000 HC RX 637 (ALT 250 FOR IP): Performed by: NURSE PRACTITIONER

## 2024-01-30 PROCEDURE — 6370000000 HC RX 637 (ALT 250 FOR IP)

## 2024-01-30 PROCEDURE — APPSS30 APP SPLIT SHARED TIME 16-30 MINUTES: Performed by: NURSE PRACTITIONER

## 2024-01-30 PROCEDURE — 99232 SBSQ HOSP IP/OBS MODERATE 35: CPT | Performed by: SURGERY

## 2024-01-30 PROCEDURE — 1200000000 HC SEMI PRIVATE

## 2024-01-30 PROCEDURE — 6360000002 HC RX W HCPCS: Performed by: NURSE PRACTITIONER

## 2024-01-30 PROCEDURE — 99232 SBSQ HOSP IP/OBS MODERATE 35: CPT | Performed by: NURSE PRACTITIONER

## 2024-01-30 PROCEDURE — 2580000003 HC RX 258: Performed by: NURSE PRACTITIONER

## 2024-01-30 PROCEDURE — 36415 COLL VENOUS BLD VENIPUNCTURE: CPT

## 2024-01-30 PROCEDURE — 85027 COMPLETE CBC AUTOMATED: CPT

## 2024-01-30 RX ORDER — GLYCOPYRROLATE 1 MG/1
1 TABLET ORAL 2 TIMES DAILY
Qty: 180 TABLET | Refills: 1 | Status: SHIPPED | OUTPATIENT
Start: 2024-01-30

## 2024-01-30 RX ORDER — INDOCYANINE GREEN AND WATER 25 MG
2.5 KIT INJECTION ONCE
Status: CANCELLED | OUTPATIENT
Start: 2024-01-30 | End: 2024-01-30

## 2024-01-30 RX ORDER — HYDROCODONE BITARTRATE AND ACETAMINOPHEN 5; 325 MG/1; MG/1
1 TABLET ORAL EVERY 6 HOURS PRN
Status: DISCONTINUED | OUTPATIENT
Start: 2024-01-30 | End: 2024-02-02 | Stop reason: HOSPADM

## 2024-01-30 RX ORDER — ACETAMINOPHEN 325 MG/1
650 TABLET ORAL EVERY 4 HOURS PRN
Status: DISCONTINUED | OUTPATIENT
Start: 2024-01-30 | End: 2024-02-02 | Stop reason: HOSPADM

## 2024-01-30 RX ADMIN — HYDROCODONE BITARTRATE AND ACETAMINOPHEN 1 TABLET: 5; 325 TABLET ORAL at 18:16

## 2024-01-30 RX ADMIN — PIPERACILLIN AND TAZOBACTAM 3375 MG: 3; .375 INJECTION, POWDER, FOR SOLUTION INTRAVENOUS at 15:59

## 2024-01-30 RX ADMIN — IBUPROFEN 400 MG: 200 TABLET, FILM COATED ORAL at 08:05

## 2024-01-30 RX ADMIN — PANTOPRAZOLE SODIUM 40 MG: 40 TABLET, DELAYED RELEASE ORAL at 15:57

## 2024-01-30 RX ADMIN — OSELTAMIVIR PHOSPHATE 75 MG: 75 CAPSULE ORAL at 21:02

## 2024-01-30 RX ADMIN — ESCITALOPRAM OXALATE 20 MG: 20 TABLET ORAL at 08:06

## 2024-01-30 RX ADMIN — PIPERACILLIN AND TAZOBACTAM 3375 MG: 3; .375 INJECTION, POWDER, FOR SOLUTION INTRAVENOUS at 00:21

## 2024-01-30 RX ADMIN — FOLIC ACID 1 MG: 1 TABLET ORAL at 08:06

## 2024-01-30 RX ADMIN — PIPERACILLIN AND TAZOBACTAM 3375 MG: 3; .375 INJECTION, POWDER, FOR SOLUTION INTRAVENOUS at 08:09

## 2024-01-30 RX ADMIN — HYDROXYZINE PAMOATE 25 MG: 25 CAPSULE ORAL at 00:23

## 2024-01-30 RX ADMIN — Medication 100 MG: at 08:06

## 2024-01-30 RX ADMIN — PANTOPRAZOLE SODIUM 40 MG: 40 TABLET, DELAYED RELEASE ORAL at 07:38

## 2024-01-30 RX ADMIN — DOCUSATE SODIUM 100 MG: 100 CAPSULE, LIQUID FILLED ORAL at 07:57

## 2024-01-30 RX ADMIN — OSELTAMIVIR PHOSPHATE 75 MG: 75 CAPSULE ORAL at 08:06

## 2024-01-30 RX ADMIN — SODIUM CHLORIDE: 9 INJECTION, SOLUTION INTRAVENOUS at 15:58

## 2024-01-30 RX ADMIN — Medication 1 TABLET: at 08:06

## 2024-01-30 RX ADMIN — ACETAMINOPHEN 650 MG: 325 TABLET ORAL at 21:02

## 2024-01-30 ASSESSMENT — PAIN DESCRIPTION - LOCATION
LOCATION: ABDOMEN

## 2024-01-30 ASSESSMENT — PAIN DESCRIPTION - ORIENTATION
ORIENTATION: RIGHT

## 2024-01-30 ASSESSMENT — PAIN DESCRIPTION - PAIN TYPE: TYPE: ACUTE PAIN

## 2024-01-30 ASSESSMENT — PAIN SCALES - WONG BAKER: WONGBAKER_NUMERICALRESPONSE: 0

## 2024-01-30 ASSESSMENT — PAIN DESCRIPTION - DESCRIPTORS
DESCRIPTORS: ACHING;DISCOMFORT

## 2024-01-30 ASSESSMENT — PAIN DESCRIPTION - ONSET: ONSET: ON-GOING

## 2024-01-30 ASSESSMENT — PAIN DESCRIPTION - FREQUENCY: FREQUENCY: INTERMITTENT

## 2024-01-30 ASSESSMENT — PAIN SCALES - GENERAL
PAINLEVEL_OUTOF10: 7
PAINLEVEL_OUTOF10: 0
PAINLEVEL_OUTOF10: 0
PAINLEVEL_OUTOF10: 7
PAINLEVEL_OUTOF10: 7
PAINLEVEL_OUTOF10: 0

## 2024-01-30 NOTE — PROGRESS NOTES
Hospitalist Progress Note    Patient:  Nancy Bruno      Unit/Bed:8B-30/030-A    YOB: 1990    MRN: [id number]       Acct: [id number]     PCP: Shola Davis APRN - CNP    Date of Admission: 1/24/2024    Assessment/Plan:    Acute on chronic chest pain, questioning of musculoskeletal in nature--2 troponins less than 6, urine toxicology screen negative, CTA chest did not reveal a pulmonary embolism, no acute intrathoracic finding; EKG is showing sinus rhythm heart rate 69; echo with EF 60 to 65%; on 1/26 added ibuprofen 400 mg every 8 hours with relief  Influenza B, not present on admission--Tamiflu 1/28; has been afebrile for 48+ hours  Abdominal pain, likely secondary to #6, #10--pregnancy test negative,  HIDA scan from 1/29 showed nonvisualization of the gallbladder suspicious for acute cholecystitis; plan is for cholecystectomy January 31  Fever--afebrile  past 24 hours, is not tachycardic, on room air; likely secondary to #2  Hypokalemia--replaced per protocol, magnesium at 1.8  Mild ileus--MiraLAX and Colace, had a bowel movement and states abdominal pain is better  Orthostatic hypotension--questioning if dehydration a factor with #8, orthostatic vital signs improved to normal; improved  Acute alcohol withdrawal--alcohol level normal on admission, phenobarbital prophylactic protocol started on 1/24-1/27, on folic acid, multivitamin and thiamine; addiction services has been consulted and saw; patient wants to stop drinking  UTI (POA) with Klebsiella oxytoca--Rocephin 1/24-1/29~sensitive; was started on Zosyn 1/29 per surgery  Acute cholelithiasis per HIDA scan--LFTs are normal, lipase is normal; was eating without issues however on 1/29 complained of right upper quadrant/epigastric pain and nausea with eating so surgery was consulted and ordered a HIDA scan that showed nonvisualization of the gallbladder suspicious for acute cholecystitis; plan is for cholecystectomy January

## 2024-01-30 NOTE — TELEPHONE ENCOUNTER
Recent Visits  Date Type Provider Dept   08/21/23 Office Visit Shola Davis APRN - CNP [facility]   05/30/23 Office Visit Shola Davis APRN - CNP [facility]   04/12/23 Office Visit Shola Davis APRN - CNP [facility]   Showing recent visits within past 540 days with a meds authorizing provider and meeting all other requirements  Future Appointments  Date Type Provider Dept   02/01/24 Appointment Shola Davis APRN - CNP [facility]   Showing future appointments within next 150 days with a meds authorizing provider and meeting all other requirements

## 2024-01-30 NOTE — TELEPHONE ENCOUNTER
Surgery Scheduling Form   [facility] [address]  [place], [place] [address]    Phone * [phone]   Surgical Scheduling Direct line Phone * [phone]  Fax * [phone]      Nancy Kolbnard      1990    female    [address] [facility] [place] [address]   Marital Status:    Single     Home Phone: [phone]   Cell Phone:   Telephone Information:   Mobile [phone]              Surgeon: Dr. Gibson  Surgery Date:01-   [facility]     Procedure: Laparoscopic cholecystectomy possible open Inpatient    Diagnosis: Acute cholecystitis    Important Medical History: In [facility]    Special Inst/Equip: ON 8B-30    CPT Codes: 91920    Latex Allergy:   no Cardiac Device:  no    Anesthesia Type: General    Case Location:  Main OR     Preadmission Testing: Phone Call      PAT Date and Time: ________________________________    PAT Confirmation #: _________________________________    Post Op Visit:  ______________________________________    Need Preop Cardiac Clearance:   no    Does patient have Cardiologist/physician? No      Surgery Conformation #:  ______________________________________________    : __________________________________ Date:____________________        Insurance Company Name:  [employer]

## 2024-01-30 NOTE — PROGRESS NOTES
I have independently performed an evaluation on Nancy . I have reviewed the above documentation completed by the NP, Jaki Fernández  Italicized font, if present, represents changes to the note made by me.    Time spent with patient 20minutes.  Time could have been discontiguous.  Time does not include procedures.  Time does include my direct assessment of the patient and coordination of care.  Time represents more than 50% of the time involved with patient care by the surgical/tauma team.        Continues to have abdominal pain.  It is in her right upper quadrant.  Her chest pain is resolved.  Not having shortness of breath.  Alert and oriented exam no distress.  Patient's HIDA scan consistent with acute cholecystitis.  Discussed treatment options the patient would like to proceed with laparoscopic cholecystectomy while she is in house.  Will schedule this hopefully for tomorrow pending no unexpected findings.  N.p.o. at midnight    Electronically signed by Sobia Gibson MD on 1/30/2024 at 2:51 PM    [facility] Surgery - Jaki Fernández, APRN - CNP  On behalf of Dr. Sobia Gibson  Daily Progress Note  Pt Name: Nancy Bruno  Medical Record Number: [id number]  Date of Birth 1990   Today's Date: 1/30/2024  Chief complaint: abd pain, gallstones  ASSESSMENT:   Hospital day # 4   HIDA - acute cholecystitis  Abdominal pain, cramping  with nausea   Leukocytosis resolved   Influenza B +    has a past medical history of Abnormal Pap smear of cervix, Anemia, Chlamydia, and Depression.  RECOMMENDATIONS:   IV hydration  Analgesics and antiemetics as needed  Full liquid low fat   Lovenox  for DVT prophylaxis  Increase activity as tolerated   Planning surgical intervention tomorrow for laparoscopic cholecystectomy,  obtain consent, labs in am, Hold lovenox on Wednesday, IV zosyn, NPO after midnight  Updated Medicine on surgical plans  SUBJECTIVE:   Nancy is doing about the same, complains of abdominal

## 2024-01-31 ENCOUNTER — ANESTHESIA (OUTPATIENT)
Dept: OPERATING ROOM | Age: 34
End: 2024-01-31
Payer: COMMERCIAL

## 2024-01-31 ENCOUNTER — ANESTHESIA EVENT (OUTPATIENT)
Dept: OPERATING ROOM | Age: 34
End: 2024-01-31
Payer: COMMERCIAL

## 2024-01-31 LAB
ANION GAP SERPL CALC-SCNC: 12 MEQ/L (ref 8–16)
BUN SERPL-MCNC: < 2 MG/DL (ref 7–22)
CALCIUM SERPL-MCNC: 8.5 MG/DL (ref 8.5–10.5)
CHLORIDE SERPL-SCNC: 104 MEQ/L (ref 98–111)
CO2 SERPL-SCNC: 25 MEQ/L (ref 23–33)
CREAT SERPL-MCNC: 0.5 MG/DL (ref 0.4–1.2)
DEPRECATED RDW RBC AUTO: 47.8 FL (ref 35–45)
ERYTHROCYTE [DISTWIDTH] IN BLOOD BY AUTOMATED COUNT: 14 % (ref 11.5–14.5)
GFR SERPL CREATININE-BSD FRML MDRD: > 60 ML/MIN/1.73M2
GLUCOSE SERPL-MCNC: 79 MG/DL (ref 70–108)
HCT VFR BLD AUTO: 30.9 % (ref 37–47)
HGB BLD-MCNC: 9.8 GM/DL (ref 12–16)
MCH RBC QN AUTO: 29.9 PG (ref 26–33)
MCHC RBC AUTO-ENTMCNC: 31.7 GM/DL (ref 32.2–35.5)
MCV RBC AUTO: 94.2 FL (ref 81–99)
PLATELET # BLD AUTO: 159 THOU/MM3 (ref 130–400)
PMV BLD AUTO: 11 FL (ref 9.4–12.4)
POTASSIUM SERPL-SCNC: 3.8 MEQ/L (ref 3.5–5.2)
RBC # BLD AUTO: 3.28 MILL/MM3 (ref 4.2–5.4)
SODIUM SERPL-SCNC: 141 MEQ/L (ref 135–145)
WBC # BLD AUTO: 4.4 THOU/MM3 (ref 4.8–10.8)

## 2024-01-31 PROCEDURE — 2580000003 HC RX 258: Performed by: NURSE PRACTITIONER

## 2024-01-31 PROCEDURE — 0FT44ZZ RESECTION OF GALLBLADDER, PERCUTANEOUS ENDOSCOPIC APPROACH: ICD-10-PCS | Performed by: SURGERY

## 2024-01-31 PROCEDURE — 6370000000 HC RX 637 (ALT 250 FOR IP): Performed by: NURSE PRACTITIONER

## 2024-01-31 PROCEDURE — 2709999900 HC NON-CHARGEABLE SUPPLY: Performed by: SURGERY

## 2024-01-31 PROCEDURE — 3700000001 HC ADD 15 MINUTES (ANESTHESIA): Performed by: SURGERY

## 2024-01-31 PROCEDURE — 6360000002 HC RX W HCPCS: Performed by: ANESTHESIOLOGY

## 2024-01-31 PROCEDURE — 6370000000 HC RX 637 (ALT 250 FOR IP)

## 2024-01-31 PROCEDURE — 2720000010 HC SURG SUPPLY STERILE: Performed by: SURGERY

## 2024-01-31 PROCEDURE — 6360000002 HC RX W HCPCS: Performed by: NURSE PRACTITIONER

## 2024-01-31 PROCEDURE — 2500000003 HC RX 250 WO HCPCS: Performed by: REGISTERED NURSE

## 2024-01-31 PROCEDURE — 1200000000 HC SEMI PRIVATE

## 2024-01-31 PROCEDURE — 6360000002 HC RX W HCPCS: Performed by: SURGERY

## 2024-01-31 PROCEDURE — 6360000002 HC RX W HCPCS: Performed by: REGISTERED NURSE

## 2024-01-31 PROCEDURE — 47562 LAPAROSCOPIC CHOLECYSTECTOMY: CPT | Performed by: SURGERY

## 2024-01-31 PROCEDURE — 88304 TISSUE EXAM BY PATHOLOGIST: CPT

## 2024-01-31 PROCEDURE — 99232 SBSQ HOSP IP/OBS MODERATE 35: CPT | Performed by: SURGERY

## 2024-01-31 PROCEDURE — 3700000000 HC ANESTHESIA ATTENDED CARE: Performed by: SURGERY

## 2024-01-31 PROCEDURE — C1889 IMPLANT/INSERT DEVICE, NOC: HCPCS | Performed by: SURGERY

## 2024-01-31 PROCEDURE — 3600000013 HC SURGERY LEVEL 3 ADDTL 15MIN: Performed by: SURGERY

## 2024-01-31 PROCEDURE — 6360000002 HC RX W HCPCS

## 2024-01-31 PROCEDURE — 36415 COLL VENOUS BLD VENIPUNCTURE: CPT

## 2024-01-31 PROCEDURE — 7100000000 HC PACU RECOVERY - FIRST 15 MIN: Performed by: SURGERY

## 2024-01-31 PROCEDURE — 80048 BASIC METABOLIC PNL TOTAL CA: CPT

## 2024-01-31 PROCEDURE — 2500000003 HC RX 250 WO HCPCS: Performed by: SURGERY

## 2024-01-31 PROCEDURE — 85027 COMPLETE CBC AUTOMATED: CPT

## 2024-01-31 PROCEDURE — 7100000001 HC PACU RECOVERY - ADDTL 15 MIN: Performed by: SURGERY

## 2024-01-31 PROCEDURE — 99232 SBSQ HOSP IP/OBS MODERATE 35: CPT | Performed by: NURSE PRACTITIONER

## 2024-01-31 PROCEDURE — 3600000003 HC SURGERY LEVEL 3 BASE: Performed by: SURGERY

## 2024-01-31 DEVICE — CLIP INT L POLYMER LOK LIG HEM O LOK (6EA/PK): Type: IMPLANTABLE DEVICE | Status: FUNCTIONAL

## 2024-01-31 RX ORDER — MORPHINE SULFATE 4 MG/ML
4 INJECTION, SOLUTION INTRAMUSCULAR; INTRAVENOUS
Status: DISCONTINUED | OUTPATIENT
Start: 2024-01-31 | End: 2024-02-02 | Stop reason: HOSPADM

## 2024-01-31 RX ORDER — DEXAMETHASONE SODIUM PHOSPHATE 10 MG/ML
INJECTION, EMULSION INTRAMUSCULAR; INTRAVENOUS PRN
Status: DISCONTINUED | OUTPATIENT
Start: 2024-01-31 | End: 2024-01-31 | Stop reason: SDUPTHER

## 2024-01-31 RX ORDER — PROPOFOL 10 MG/ML
INJECTION, EMULSION INTRAVENOUS PRN
Status: DISCONTINUED | OUTPATIENT
Start: 2024-01-31 | End: 2024-01-31 | Stop reason: SDUPTHER

## 2024-01-31 RX ORDER — SODIUM CHLORIDE 0.9 % (FLUSH) 0.9 %
5-40 SYRINGE (ML) INJECTION PRN
Status: DISCONTINUED | OUTPATIENT
Start: 2024-01-31 | End: 2024-01-31 | Stop reason: HOSPADM

## 2024-01-31 RX ORDER — FENTANYL CITRATE 50 UG/ML
50 INJECTION, SOLUTION INTRAMUSCULAR; INTRAVENOUS EVERY 5 MIN PRN
Status: COMPLETED | OUTPATIENT
Start: 2024-01-31 | End: 2024-01-31

## 2024-01-31 RX ORDER — CEFAZOLIN SODIUM 1 G/3ML
INJECTION, POWDER, FOR SOLUTION INTRAMUSCULAR; INTRAVENOUS PRN
Status: DISCONTINUED | OUTPATIENT
Start: 2024-01-31 | End: 2024-01-31 | Stop reason: SDUPTHER

## 2024-01-31 RX ORDER — SODIUM CHLORIDE 9 MG/ML
INJECTION, SOLUTION INTRAVENOUS PRN
Status: DISCONTINUED | OUTPATIENT
Start: 2024-01-31 | End: 2024-01-31 | Stop reason: HOSPADM

## 2024-01-31 RX ORDER — GLYCOPYRROLATE 1 MG/5 ML
SYRINGE (ML) INTRAVENOUS PRN
Status: DISCONTINUED | OUTPATIENT
Start: 2024-01-31 | End: 2024-01-31 | Stop reason: SDUPTHER

## 2024-01-31 RX ORDER — ONDANSETRON 2 MG/ML
4 INJECTION INTRAMUSCULAR; INTRAVENOUS
Status: DISCONTINUED | OUTPATIENT
Start: 2024-01-31 | End: 2024-01-31 | Stop reason: HOSPADM

## 2024-01-31 RX ORDER — ONDANSETRON 2 MG/ML
INJECTION INTRAMUSCULAR; INTRAVENOUS PRN
Status: DISCONTINUED | OUTPATIENT
Start: 2024-01-31 | End: 2024-01-31 | Stop reason: SDUPTHER

## 2024-01-31 RX ORDER — FENTANYL CITRATE 50 UG/ML
INJECTION, SOLUTION INTRAMUSCULAR; INTRAVENOUS PRN
Status: DISCONTINUED | OUTPATIENT
Start: 2024-01-31 | End: 2024-01-31 | Stop reason: SDUPTHER

## 2024-01-31 RX ORDER — MORPHINE SULFATE 2 MG/ML
2 INJECTION, SOLUTION INTRAMUSCULAR; INTRAVENOUS
Status: DISCONTINUED | OUTPATIENT
Start: 2024-01-31 | End: 2024-02-02 | Stop reason: HOSPADM

## 2024-01-31 RX ORDER — SUCCINYLCHOLINE/SOD CL,ISO/PF 100 MG/5ML
SYRINGE (ML) INTRAVENOUS PRN
Status: DISCONTINUED | OUTPATIENT
Start: 2024-01-31 | End: 2024-01-31 | Stop reason: SDUPTHER

## 2024-01-31 RX ORDER — ROCURONIUM BROMIDE 10 MG/ML
INJECTION, SOLUTION INTRAVENOUS PRN
Status: DISCONTINUED | OUTPATIENT
Start: 2024-01-31 | End: 2024-01-31 | Stop reason: SDUPTHER

## 2024-01-31 RX ORDER — MIDAZOLAM HYDROCHLORIDE 1 MG/ML
INJECTION INTRAMUSCULAR; INTRAVENOUS PRN
Status: DISCONTINUED | OUTPATIENT
Start: 2024-01-31 | End: 2024-01-31 | Stop reason: SDUPTHER

## 2024-01-31 RX ORDER — BUPIVACAINE HYDROCHLORIDE 5 MG/ML
INJECTION, SOLUTION EPIDURAL; INTRACAUDAL PRN
Status: DISCONTINUED | OUTPATIENT
Start: 2024-01-31 | End: 2024-01-31 | Stop reason: ALTCHOICE

## 2024-01-31 RX ORDER — SODIUM CHLORIDE 0.9 % (FLUSH) 0.9 %
5-40 SYRINGE (ML) INJECTION EVERY 12 HOURS SCHEDULED
Status: DISCONTINUED | OUTPATIENT
Start: 2024-01-31 | End: 2024-01-31 | Stop reason: HOSPADM

## 2024-01-31 RX ORDER — KETAMINE HCL IN NACL, ISO-OSM 100MG/10ML
SYRINGE (ML) INJECTION PRN
Status: DISCONTINUED | OUTPATIENT
Start: 2024-01-31 | End: 2024-01-31 | Stop reason: SDUPTHER

## 2024-01-31 RX ORDER — MEPERIDINE HYDROCHLORIDE 25 MG/ML
12.5 INJECTION INTRAMUSCULAR; INTRAVENOUS; SUBCUTANEOUS EVERY 5 MIN PRN
Status: DISCONTINUED | OUTPATIENT
Start: 2024-01-31 | End: 2024-01-31 | Stop reason: HOSPADM

## 2024-01-31 RX ORDER — INDOCYANINE GREEN AND WATER 25 MG
KIT INJECTION PRN
Status: DISCONTINUED | OUTPATIENT
Start: 2024-01-31 | End: 2024-01-31 | Stop reason: ALTCHOICE

## 2024-01-31 RX ORDER — LIDOCAINE HCL/PF 100 MG/5ML
SYRINGE (ML) INJECTION PRN
Status: DISCONTINUED | OUTPATIENT
Start: 2024-01-31 | End: 2024-01-31 | Stop reason: SDUPTHER

## 2024-01-31 RX ADMIN — ONDANSETRON 4 MG: 2 INJECTION INTRAMUSCULAR; INTRAVENOUS at 19:41

## 2024-01-31 RX ADMIN — OSELTAMIVIR PHOSPHATE 75 MG: 75 CAPSULE ORAL at 09:14

## 2024-01-31 RX ADMIN — Medication 0.2 MG: at 18:51

## 2024-01-31 RX ADMIN — HYDROCODONE BITARTRATE AND ACETAMINOPHEN 1 TABLET: 5; 325 TABLET ORAL at 12:24

## 2024-01-31 RX ADMIN — Medication 25 MG: at 18:35

## 2024-01-31 RX ADMIN — Medication 25 MG: at 18:45

## 2024-01-31 RX ADMIN — HYDROMORPHONE HYDROCHLORIDE 0.25 MG: 1 INJECTION, SOLUTION INTRAMUSCULAR; INTRAVENOUS; SUBCUTANEOUS at 03:48

## 2024-01-31 RX ADMIN — SUGAMMADEX 200 MG: 100 INJECTION, SOLUTION INTRAVENOUS at 19:54

## 2024-01-31 RX ADMIN — HYDROCODONE BITARTRATE AND ACETAMINOPHEN 1 TABLET: 5; 325 TABLET ORAL at 06:01

## 2024-01-31 RX ADMIN — MORPHINE SULFATE 2 MG: 2 INJECTION, SOLUTION INTRAMUSCULAR; INTRAVENOUS at 23:24

## 2024-01-31 RX ADMIN — SUGAMMADEX 200 MG: 100 INJECTION, SOLUTION INTRAVENOUS at 19:46

## 2024-01-31 RX ADMIN — HYDROMORPHONE HYDROCHLORIDE 0.25 MG: 1 INJECTION, SOLUTION INTRAMUSCULAR; INTRAVENOUS; SUBCUTANEOUS at 09:26

## 2024-01-31 RX ADMIN — HYDROCODONE BITARTRATE AND ACETAMINOPHEN 1 TABLET: 5; 325 TABLET ORAL at 00:03

## 2024-01-31 RX ADMIN — FOLIC ACID 1 MG: 1 TABLET ORAL at 09:14

## 2024-01-31 RX ADMIN — SODIUM CHLORIDE: 9 INJECTION, SOLUTION INTRAVENOUS at 14:30

## 2024-01-31 RX ADMIN — FENTANYL CITRATE 50 MCG: 50 INJECTION, SOLUTION INTRAMUSCULAR; INTRAVENOUS at 18:35

## 2024-01-31 RX ADMIN — PIPERACILLIN AND TAZOBACTAM 3375 MG: 3; .375 INJECTION, POWDER, FOR SOLUTION INTRAVENOUS at 00:05

## 2024-01-31 RX ADMIN — ROCURONIUM BROMIDE 30 MG: 10 INJECTION INTRAVENOUS at 18:38

## 2024-01-31 RX ADMIN — PROPOFOL 200 MG: 10 INJECTION, EMULSION INTRAVENOUS at 18:35

## 2024-01-31 RX ADMIN — PIPERACILLIN AND TAZOBACTAM 3375 MG: 3; .375 INJECTION, POWDER, FOR SOLUTION INTRAVENOUS at 09:18

## 2024-01-31 RX ADMIN — Medication 100 MG: at 18:35

## 2024-01-31 RX ADMIN — CEFAZOLIN 2 G: 1 INJECTION, POWDER, FOR SOLUTION INTRAMUSCULAR; INTRAVENOUS at 18:37

## 2024-01-31 RX ADMIN — ONDANSETRON 4 MG: 2 INJECTION INTRAMUSCULAR; INTRAVENOUS at 03:48

## 2024-01-31 RX ADMIN — FENTANYL CITRATE 50 MCG: 50 INJECTION INTRAMUSCULAR; INTRAVENOUS at 20:15

## 2024-01-31 RX ADMIN — DEXAMETHASONE SODIUM PHOSPHATE 10 MG: 10 INJECTION, EMULSION INTRAMUSCULAR; INTRAVENOUS at 18:35

## 2024-01-31 RX ADMIN — Medication 1 TABLET: at 09:14

## 2024-01-31 RX ADMIN — Medication 100 MG: at 09:14

## 2024-01-31 RX ADMIN — MIDAZOLAM 2 MG: 1 INJECTION INTRAMUSCULAR; INTRAVENOUS at 18:31

## 2024-01-31 RX ADMIN — PIPERACILLIN AND TAZOBACTAM 3375 MG: 3; .375 INJECTION, POWDER, FOR SOLUTION INTRAVENOUS at 20:47

## 2024-01-31 RX ADMIN — OSELTAMIVIR PHOSPHATE 75 MG: 75 CAPSULE ORAL at 20:48

## 2024-01-31 RX ADMIN — SODIUM CHLORIDE: 9 INJECTION, SOLUTION INTRAVENOUS at 19:56

## 2024-01-31 RX ADMIN — FENTANYL CITRATE 50 MCG: 50 INJECTION, SOLUTION INTRAMUSCULAR; INTRAVENOUS at 19:44

## 2024-01-31 RX ADMIN — ESCITALOPRAM OXALATE 20 MG: 20 TABLET ORAL at 09:14

## 2024-01-31 RX ADMIN — ROCURONIUM BROMIDE 20 MG: 10 INJECTION INTRAVENOUS at 18:43

## 2024-01-31 RX ADMIN — HYDROXYZINE PAMOATE 25 MG: 25 CAPSULE ORAL at 17:39

## 2024-01-31 RX ADMIN — FENTANYL CITRATE 50 MCG: 50 INJECTION INTRAMUSCULAR; INTRAVENOUS at 20:10

## 2024-01-31 ASSESSMENT — PAIN SCALES - GENERAL
PAINLEVEL_OUTOF10: 2
PAINLEVEL_OUTOF10: 3
PAINLEVEL_OUTOF10: 0
PAINLEVEL_OUTOF10: 3
PAINLEVEL_OUTOF10: 3
PAINLEVEL_OUTOF10: 7
PAINLEVEL_OUTOF10: 7
PAINLEVEL_OUTOF10: 0
PAINLEVEL_OUTOF10: 5
PAINLEVEL_OUTOF10: 6
PAINLEVEL_OUTOF10: 7
PAINLEVEL_OUTOF10: 0
PAINLEVEL_OUTOF10: 6
PAINLEVEL_OUTOF10: 5
PAINLEVEL_OUTOF10: 6

## 2024-01-31 ASSESSMENT — PAIN DESCRIPTION - ORIENTATION
ORIENTATION: RIGHT
ORIENTATION: RIGHT;LEFT
ORIENTATION: RIGHT
ORIENTATION: RIGHT

## 2024-01-31 ASSESSMENT — PAIN SCALES - WONG BAKER: WONGBAKER_NUMERICALRESPONSE: 0

## 2024-01-31 ASSESSMENT — PAIN DESCRIPTION - DESCRIPTORS
DESCRIPTORS: ACHING;DISCOMFORT
DESCRIPTORS: SHARP
DESCRIPTORS: ACHING;DISCOMFORT
DESCRIPTORS: ACHING;DISCOMFORT
DESCRIPTORS: ACHING;CRAMPING;DISCOMFORT
DESCRIPTORS: ACHING;DISCOMFORT
DESCRIPTORS: ACHING;CRAMPING;DISCOMFORT

## 2024-01-31 ASSESSMENT — PAIN - FUNCTIONAL ASSESSMENT
PAIN_FUNCTIONAL_ASSESSMENT: ACTIVITIES ARE NOT PREVENTED
PAIN_FUNCTIONAL_ASSESSMENT: ACTIVITIES ARE NOT PREVENTED
PAIN_FUNCTIONAL_ASSESSMENT: 0-10
PAIN_FUNCTIONAL_ASSESSMENT: ACTIVITIES ARE NOT PREVENTED

## 2024-01-31 ASSESSMENT — PAIN DESCRIPTION - LOCATION
LOCATION: ABDOMEN
LOCATION: BACK
LOCATION: ABDOMEN

## 2024-01-31 ASSESSMENT — PAIN DESCRIPTION - FREQUENCY
FREQUENCY: CONTINUOUS
FREQUENCY: INTERMITTENT
FREQUENCY: CONTINUOUS

## 2024-01-31 ASSESSMENT — PAIN DESCRIPTION - ONSET
ONSET: ON-GOING

## 2024-01-31 ASSESSMENT — PAIN DESCRIPTION - PAIN TYPE
TYPE: SURGICAL PAIN
TYPE: ACUTE PAIN

## 2024-01-31 NOTE — ADT AUTH CERT
Utilization Reviews       1/27  by Elkin Chandler, RN  Last Updated by Elkin Chandler, RN on 1/29/2024 1144     Review Status Created By   In Primary Elkin Chandler RN       Review Type   --      Criteria Review   DATE: 1/27 med surg tele  Inpatient day 2       RELEVANT BASELINES: (lab values, vitals, o2 amount/delivery, etc.)  Room air      PERTINENT UPDATES:  Complains of dizziness however states it is not new that she has episodes like this in the past  Fevers      VITALS:       01/27/24 0922   Vitals   Blood Pressure Lying 94/54   Pulse Lying 58 PER MINUTE   Blood Pressure Sitting 83/54   Pulse Sitting 73 PER MINUTE   Blood Pressure Standing 82/50   Pulse Standing 83 PER MINUTE      101.2  16  83  91/61  98% room air      ABNL/PERTINENT LABS/RADIOLOGY/DIAGNOSTIC STUDIES:  Wbc 8.1, rbc 3.80, hgb 11.5, hct 35.6   Potassium 3.4, creatinine 0.5, GFR >60      PHYSICAL EXAM:  General appearance: No apparent distress, appears stated age and cooperative     MD CONSULTS/ASSESSMENT AND PLAN:  **Internal Medicine**  Acute on chronic chest pain, questioning of musculoskeletal in nature--2 troponins less than 6, urine toxicology screen negative, CTA chest did not reveal a pulmonary embolism, no acute intrathoracic finding; EKG is showing sinus rhythm heart rate 69; echo with EF 60 to 65%; added ibuprofen 400 mg every 8 hours with relief  Orthostatic hypotension--questioning if dehydration a factor with #3, orthostatic vital signs were positive, will give 1 L 0.9 normal saline  Acute alcohol withdrawal--alcohol level normal on admission, phenobarbital prophylactic protocol started on 1/24-1/27, on folic acid, multivitamin and thiamine; addiction services has been consulted and saw; patient wants to stop drinking  UTI (POA) with Klebsiella oxytoca--Rocephin 1/24~sensitive  Cholelithiasis without evidence of acute cholecystitis--ALT and AST slightly elevated however could be secondary to chronic alcohol use, bilirubin is  screen negative, CTA chest did not reveal a pulmonary embolism, no acute intrathoracic finding; EKG is showing sinus rhythm heart rate 69; echo with EF 60 to 65%; add ibuprofen 400 mg every 8 hours  Acute alcohol withdrawal--alcohol level normal on admission, phenobarbital prophylactic protocol started on , on folic acid, multivitamin and thiamine; addiction services has been consulted and saw; patient wants to stop drinking  UTI (POA) with Klebsiella oxytoca--Rocephin ~sensitive  Cholelithiasis without evidence of acute cholecystitis--ALT and AST slightly elevated however could be secondary to chronic alcohol use, bilirubin is normal, lipase is normal  Hyperechoic structure in the right hepatic lobe, likely a hemangioma  Probable left hepatic cyst   Elevated LFTs--possibly secondary to chronic alcohol use, much improved, monitor  Normocytic anemia--stable  Possible gastritis, possibly secondary to alcohol use--takes Prilosec 20 mg twice daily before meals at home  Anxiety/depression--treated     MEDICATIONS:  Rocephin 1000 mg iv q 24 hrs  Lovenox 40 mg subq q 24 hrs   Home po medications  Folic acid 1 mg po daily   Phenobarbital 32.4 mg po BID   Thiamine 100 mg po daily      ORDERS:  Vital signs per routine      PT/OT/SLP/CM/RN ASSESSMENT OR NOTES:  **Case Management**  Plans to return home with family, denied needs.          PA Recommendation Letter by Elkin Chandler RN  Last Updated by Elkin Chandler, RN on 2024 0937     Review Status Created By   In Primary Elkin Chandler, KIM       Review Type   --      Criteria Review   Name: Nancy Bruno   : 1990   CSN: [id number]   INSURANCE: [facility]    Date of admission: 24    Current status: Observation    We recommend that patient's status is upgraded to INPATIENT; if you agree, please place a new ADMIT order in [facility] as recommended.     Rationale:     Clinical summary 34 y/o female with a history significant for

## 2024-01-31 NOTE — PROGRESS NOTES
Hospitalist Progress Note    Patient:  Nancy Bruno      Unit/Bed:8B-30/030-A    YOB: 1990    MRN: [id number]       Acct: [id number]     PCP: Shola Davis APRN - CNP    Date of Admission: 1/24/2024    Assessment/Plan:    Acute on chronic chest pain, questioning of musculoskeletal in nature--2 troponins less than 6, urine toxicology screen negative, CTA chest did not reveal a pulmonary embolism, no acute intrathoracic finding; EKG is showing sinus rhythm heart rate 69; echo with EF 60 to 65%; on 1/26 added ibuprofen 400 mg every 8 hours with relief  Influenza B, not present on admission--Tamiflu 1/28; has been afebrile for 48+ hours  Abdominal pain, likely secondary to #6, #10--pregnancy test negative,  HIDA scan from 1/29 showed nonvisualization of the gallbladder suspicious for acute cholecystitis; plan is for cholecystectomy January 31  Fever--afebrile  past 48 hours, is not tachycardic, on room air; likely secondary to #2  Hypokalemia--replaced per protocol, magnesium at 1.8  Mild ileus--MiraLAX and Colace, had a bowel movement and states abdominal pain is better  Orthostatic hypotension--questioning if dehydration a factor with #8, orthostatic vital signs improved to normal; resolved  Acute alcohol withdrawal--alcohol level normal on admission, phenobarbital prophylactic protocol started on 1/24-1/27, on folic acid, multivitamin and thiamine; addiction services has been consulted and saw; patient wants to stop drinking  UTI (POA) with Klebsiella oxytoca--Rocephin 1/24-1/29~sensitive; was started on Zosyn 1/29 per surgery  Acute cholelithiasis per HIDA scan--LFTs are normal, lipase is normal; was eating without issues however on 1/29 complained of right upper quadrant/epigastric pain and nausea with eating so surgery was consulted and ordered a HIDA scan that showed nonvisualization of the gallbladder suspicious for acute cholecystitis; plan is for cholecystectomy January  active ROM x 4 extremities.  Skin: Skin color, texture, turgor normal.    Neurologic:  Neurovascularly intact without any focal sensory/motor deficits. Cranial nerves: II-XII intact, grossly non-focal.  Psychiatric: Alert and oriented, thought content appropriate  Capillary Refill: Brisk,< 3 seconds   Peripheral Pulses: +2 palpable, equal bilaterally       Labs:   Recent Labs     01/28/24  0805 01/30/24  0946   WBC 11.7* 4.9   HGB 11.0* 10.0*   HCT 33.6* 30.1*    145       Recent Labs     01/28/24  0805      K 3.6      CO2 22*   BUN 4*   CREATININE 0.6   CALCIUM 8.0*       Recent Labs     01/28/24  0805   AST 20   ALT 34   BILITOT 0.2*   ALKPHOS 80       Microbiology:    Urine cultures with Klebsiella oxytoca  Blood culture showing no growth at 48 hours    Urinalysis:      Lab Results   Component Value Date/Time    NITRU POSITIVE 01/24/2024 02:00 PM    WBCUA  01/24/2024 02:00 PM    BACTERIA MANY 01/24/2024 02:00 PM    RBCUA NONE 01/24/2024 02:00 PM    BLOODU TRACE 01/24/2024 02:00 PM    GLUCOSEU NEGATIVE 01/24/2024 02:00 PM       Radiology:  US GALLBLADDER RUQ    Result Date: 1/24/2024  PROCEDURE: US GALLBLADDER RUQ CLINICAL INFORMATION: Elevated liver function tests TECHNIQUE: Ultrasound of the right upper quadrant was performed. Grayscale and color images were obtained. COMPARISON: None FINDINGS: There are poorly defined calcifications in the gallbladder. There is no gallbladder wall thickening or pericholecystic fluid. The common bile duct is normal, measuring 0.3 cm. There is no intrahepatic biliary ductal dilation. An avascular anechoic structure in the left hepatic lobe measures 0.5 cm. A hyperechoic structure in the right hepatic lobe measures 1.0 cm.     1. Cholelithiasis without evidence of acute cholecystitis. 2. Hyperechoic structure in the right hepatic lobe, likely a hemangioma. 3. Probable left hepatic cyst. Final report electronically signed by Dr. Finesse Chavira on 1/24/2024

## 2024-01-31 NOTE — ANESTHESIA PRE PROCEDURE
Or    ondansetron (ZOFRAN) injection 4 mg  4 mg IntraVENous Q6H PRN Jacquie Snyder PA-STEPH   4 mg at 24 0348    polyethylene glycol (GLYCOLAX) packet 17 g  17 g Oral Daily PRN Jacquie Snyder PA-C        acetaminophen (TYLENOL) suppository 650 mg  650 mg Rectal Q6H PRN Jacquie Snyder PA-STEPH        sodium chloride flush 0.9 % injection 5-40 mL  5-40 mL IntraVENous 2 times per day Jacquie Snyder PA-C   10 mL at 24 0955    sodium chloride flush 0.9 % injection 5-40 mL  5-40 mL IntraVENous PRN Jacquie Snyder PA-STEPH        0.9 % sodium chloride infusion   IntraVENous PRN Jacquie Snyder PA-C        thiamine tablet 100 mg  100 mg Oral Daily Jacquie Snyder PA-C   100 mg at 24 0914    multivitamin 1 tablet  1 tablet Oral Daily Jacquie Snyder PA-C   1 tablet at 24 0914    folic acid (FOLVITE) tablet 1 mg  1 mg Oral Daily Jacquie Snyder PA-C   1 mg at 24 0914       Allergies:  No Known Allergies    Problem List:    Patient Active Problem List   Diagnosis Code    Declines  (vaginal birth after ) trial O34.219    Moderate episode of recurrent major depressive disorder (HCC) F33.1    Generalized anxiety disorder with panic attacks F41.1, F41.0    Gastroesophageal reflux disease K21.9    Generalized hyperhidrosis R61    Chest pain in adult R07.9    Alcoholism (HCC) F10.20    Gallstones K80.20       Past Medical History:        Diagnosis Date    Abnormal Pap smear of cervix     LSIL, pt never returned for colp    Anemia     during and outside of pregnancies    Chlamydia     Depression     was taking medication       Past Surgical History:        Procedure Laterality Date     SECTION      x3       Social History:    Social History     Tobacco Use    Smoking status: Former     Current packs/day: 0.00     Average packs/day: 0.3 packs/day for 5.0 years (1.3 ttl pk-yrs)     Types: Cigarettes     Start date:      Quit date: 2020     Years since

## 2024-01-31 NOTE — CARE COORDINATION
1/31/24, 1:40 PM EST    DISCHARGE ON GOING EVALUATION    Nancy M [facility] day: 5  Location: 8B-30/030-A Reason for admit: Alcoholism (HCC) [F10.20]  Urinary tract infection without hematuria, site unspecified [N39.0]  Biliary calculus of other site without obstruction [K80.80]  Chest pain in adult [R07.9]  Acute alcoholic gastritis without hemorrhage [K29.20]  Alcohol dependence with withdrawal (HCC) [F10.239]   Procedure: 1/24 CTA Chest W WO: No pulmonary embolism. 2. No acute intrathoracic findings.  1/24 US Gallbladder: Cholelithiasis without evidence of acute cholecystitis. 2. Hyperechoic structure in the right hepatic lobe, likely a hemangioma. 3. Probable left hepatic cyst  1/28 + influenza   1/29  HIDA   IMPRESSION:  1. Patent common bile duct.  2. Nonvisualization of the gallbladder suspicious for acute cholecystitis.  Barriers to Discharge: Planning laparoscopic cholecystectomy today. NPO. IVF.  Zosyn. Dilaudid and Norco for pain. Tamiflu.  PCP: Shola Davis, APRN - CNP  Readmission Risk Score: 4%  Patient Goals/Plan/Treatment Preferences: Plans return home with family.

## 2024-01-31 NOTE — PROGRESS NOTES
I have independently performed an evaluation on Nancy . I have reviewed the above documentation completed by the NP, Jaki Fernández  Italicized font, if present, represents changes to the note made by me.    Time spent with patient 25minutes.  Time could have been discontiguous.  Time does not include procedures.  Time does include my direct assessment of the patient and coordination of care.  Time represents more than 50% of the time involved with patient care by the surgical/tauma team.      Patient continues to have significant pain no acute distress.  Discussed with patient surgery, reviewed the procedure, how it is performed.  Discussed the surgery with complications including but not limited to bleeding infection demonstrating structures need for more procedures anesthesia complications.  Bile leak.  Patient expressed understanding like to proceed with laparoscopic possible open cholecystectomy.      Electronically signed by Sobia Gibson MD on 1/31/2024 at 6:31 PM      [facility] Surgery - Jaki Fernández, APRN - CNP  On behalf of Dr. Sobia Gibson  Daily Progress Note  Pt Name: Nancy Bruno  Medical Record Number: [id number]  Date of Birth 1990   Today's Date: 1/31/2024  Chief complaint: abd pain, gallstones  ASSESSMENT:   Hospital day # 5   HIDA - acute cholecystitis  Abdominal pain, cramping  with nausea   Influenza B +    has a past medical history of Abnormal Pap smear of cervix, Anemia, Chlamydia, and Depression.  RECOMMENDATIONS:   IV hydration  Analgesics and antiemetics as needed  NPO  Lovenox  for DVT prophylaxis  Increase activity as tolerated   Planning surgical intervention today for laparoscopic cholecystectomy,  obtain consent, labs in am, Hold lovenox on Wednesday, IV zosyn, NPO after midnight  SUBJECTIVE:   Nancy is doing about the same, complains of abdominal pain and cramping pain medication is controlling pain. Discussed surgical procedure today. Answered all    2. Hyperechoic structure in the right hepatic lobe, likely a hemangioma.   3. Probable left hepatic cyst.      Final report electronically signed by Dr. Finesse Chavira on 1/24/2024 3:32 PM      CTA CHEST W WO CONTRAST   Final Result   1. No pulmonary embolism.   2. No acute intrathoracic findings.      Final report electronically signed by Dr. Finesse Chavira on 1/24/2024 3:20 PM        Total time spent in care of patient:  15 minutes collectively between subjective/objective examination, chart review, documentation, clinical reasoning and discussion with attending regarding plan/interval changes.      Electronically signed by SHIRA Lombardi CNP on 1/31/2024 at 11:21 AM

## 2024-02-01 PROBLEM — K81.0 ACUTE CHOLECYSTITIS: Status: ACTIVE | Noted: 2024-02-01

## 2024-02-01 PROBLEM — K29.20 ACUTE ALCOHOLIC GASTRITIS WITHOUT HEMORRHAGE: Status: ACTIVE | Noted: 2024-02-01

## 2024-02-01 LAB
DEPRECATED RDW RBC AUTO: 47.3 FL (ref 35–45)
ERYTHROCYTE [DISTWIDTH] IN BLOOD BY AUTOMATED COUNT: 13.6 % (ref 11.5–14.5)
HCT VFR BLD AUTO: 30.4 % (ref 37–47)
HGB BLD-MCNC: 9.7 GM/DL (ref 12–16)
MCH RBC QN AUTO: 30.1 PG (ref 26–33)
MCHC RBC AUTO-ENTMCNC: 31.9 GM/DL (ref 32.2–35.5)
MCV RBC AUTO: 94.4 FL (ref 81–99)
PLATELET # BLD AUTO: 194 THOU/MM3 (ref 130–400)
PMV BLD AUTO: 10.6 FL (ref 9.4–12.4)
RBC # BLD AUTO: 3.22 MILL/MM3 (ref 4.2–5.4)
WBC # BLD AUTO: 5.5 THOU/MM3 (ref 4.8–10.8)

## 2024-02-01 PROCEDURE — 6370000000 HC RX 637 (ALT 250 FOR IP)

## 2024-02-01 PROCEDURE — APPSS30 APP SPLIT SHARED TIME 16-30 MINUTES: Performed by: NURSE PRACTITIONER

## 2024-02-01 PROCEDURE — 6370000000 HC RX 637 (ALT 250 FOR IP): Performed by: NURSE PRACTITIONER

## 2024-02-01 PROCEDURE — 36415 COLL VENOUS BLD VENIPUNCTURE: CPT

## 2024-02-01 PROCEDURE — 99024 POSTOP FOLLOW-UP VISIT: CPT | Performed by: NURSE PRACTITIONER

## 2024-02-01 PROCEDURE — 99232 SBSQ HOSP IP/OBS MODERATE 35: CPT | Performed by: PHYSICIAN ASSISTANT

## 2024-02-01 PROCEDURE — 2580000003 HC RX 258: Performed by: NURSE PRACTITIONER

## 2024-02-01 PROCEDURE — 6360000002 HC RX W HCPCS: Performed by: NURSE PRACTITIONER

## 2024-02-01 PROCEDURE — 2580000003 HC RX 258

## 2024-02-01 PROCEDURE — 1200000000 HC SEMI PRIVATE

## 2024-02-01 PROCEDURE — 85027 COMPLETE CBC AUTOMATED: CPT

## 2024-02-01 RX ADMIN — FOLIC ACID 1 MG: 1 TABLET ORAL at 08:02

## 2024-02-01 RX ADMIN — ACETAMINOPHEN 650 MG: 325 TABLET ORAL at 15:00

## 2024-02-01 RX ADMIN — SODIUM CHLORIDE, PRESERVATIVE FREE 10 ML: 5 INJECTION INTRAVENOUS at 21:48

## 2024-02-01 RX ADMIN — HYDROMORPHONE HYDROCHLORIDE 0.25 MG: 1 INJECTION, SOLUTION INTRAMUSCULAR; INTRAVENOUS; SUBCUTANEOUS at 04:09

## 2024-02-01 RX ADMIN — PANTOPRAZOLE SODIUM 40 MG: 40 TABLET, DELAYED RELEASE ORAL at 04:16

## 2024-02-01 RX ADMIN — Medication 1 TABLET: at 08:02

## 2024-02-01 RX ADMIN — OSELTAMIVIR PHOSPHATE 75 MG: 75 CAPSULE ORAL at 21:48

## 2024-02-01 RX ADMIN — PIPERACILLIN AND TAZOBACTAM 3375 MG: 3; .375 INJECTION, POWDER, FOR SOLUTION INTRAVENOUS at 21:53

## 2024-02-01 RX ADMIN — HYDROMORPHONE HYDROCHLORIDE 0.25 MG: 1 INJECTION, SOLUTION INTRAMUSCULAR; INTRAVENOUS; SUBCUTANEOUS at 10:01

## 2024-02-01 RX ADMIN — PIPERACILLIN AND TAZOBACTAM 3375 MG: 3; .375 INJECTION, POWDER, FOR SOLUTION INTRAVENOUS at 03:12

## 2024-02-01 RX ADMIN — SODIUM CHLORIDE: 9 INJECTION, SOLUTION INTRAVENOUS at 12:41

## 2024-02-01 RX ADMIN — HYDROCODONE BITARTRATE AND ACETAMINOPHEN 1 TABLET: 5; 325 TABLET ORAL at 01:31

## 2024-02-01 RX ADMIN — HYDROCODONE BITARTRATE AND ACETAMINOPHEN 1 TABLET: 5; 325 TABLET ORAL at 17:42

## 2024-02-01 RX ADMIN — DOCUSATE SODIUM 100 MG: 100 CAPSULE, LIQUID FILLED ORAL at 21:48

## 2024-02-01 RX ADMIN — OSELTAMIVIR PHOSPHATE 75 MG: 75 CAPSULE ORAL at 08:02

## 2024-02-01 RX ADMIN — ESCITALOPRAM OXALATE 20 MG: 20 TABLET ORAL at 08:02

## 2024-02-01 RX ADMIN — TRAZODONE HYDROCHLORIDE 50 MG: 50 TABLET ORAL at 21:48

## 2024-02-01 RX ADMIN — PANTOPRAZOLE SODIUM 40 MG: 40 TABLET, DELAYED RELEASE ORAL at 16:58

## 2024-02-01 RX ADMIN — Medication 100 MG: at 08:02

## 2024-02-01 RX ADMIN — HYDROMORPHONE HYDROCHLORIDE 0.25 MG: 1 INJECTION, SOLUTION INTRAMUSCULAR; INTRAVENOUS; SUBCUTANEOUS at 21:48

## 2024-02-01 RX ADMIN — HYDROCODONE BITARTRATE AND ACETAMINOPHEN 1 TABLET: 5; 325 TABLET ORAL at 08:11

## 2024-02-01 ASSESSMENT — PAIN DESCRIPTION - ORIENTATION
ORIENTATION: RIGHT;LEFT;MID
ORIENTATION: RIGHT;LEFT;MID
ORIENTATION: RIGHT;LEFT
ORIENTATION: RIGHT;LEFT;MID

## 2024-02-01 ASSESSMENT — PAIN DESCRIPTION - PAIN TYPE
TYPE: SURGICAL PAIN

## 2024-02-01 ASSESSMENT — PAIN - FUNCTIONAL ASSESSMENT
PAIN_FUNCTIONAL_ASSESSMENT: ACTIVITIES ARE NOT PREVENTED
PAIN_FUNCTIONAL_ASSESSMENT: PREVENTS OR INTERFERES SOME ACTIVE ACTIVITIES AND ADLS
PAIN_FUNCTIONAL_ASSESSMENT: ACTIVITIES ARE NOT PREVENTED

## 2024-02-01 ASSESSMENT — PAIN DESCRIPTION - DESCRIPTORS
DESCRIPTORS: ACHING;DISCOMFORT
DESCRIPTORS: TENDER;SORE;DISCOMFORT
DESCRIPTORS: ACHING;DISCOMFORT

## 2024-02-01 ASSESSMENT — PAIN DESCRIPTION - ONSET
ONSET: ON-GOING

## 2024-02-01 ASSESSMENT — PAIN DESCRIPTION - LOCATION
LOCATION: ABDOMEN

## 2024-02-01 ASSESSMENT — PAIN SCALES - GENERAL
PAINLEVEL_OUTOF10: 10
PAINLEVEL_OUTOF10: 7
PAINLEVEL_OUTOF10: 6
PAINLEVEL_OUTOF10: 0
PAINLEVEL_OUTOF10: 7
PAINLEVEL_OUTOF10: 7
PAINLEVEL_OUTOF10: 0
PAINLEVEL_OUTOF10: 4
PAINLEVEL_OUTOF10: 8
PAINLEVEL_OUTOF10: 3
PAINLEVEL_OUTOF10: 0
PAINLEVEL_OUTOF10: 4

## 2024-02-01 ASSESSMENT — PAIN DESCRIPTION - FREQUENCY
FREQUENCY: CONTINUOUS
FREQUENCY: CONTINUOUS
FREQUENCY: INTERMITTENT
FREQUENCY: CONTINUOUS

## 2024-02-01 ASSESSMENT — PAIN SCALES - WONG BAKER: WONGBAKER_NUMERICALRESPONSE: 0

## 2024-02-01 NOTE — ANESTHESIA POSTPROCEDURE EVALUATION
Department of Anesthesiology  Postprocedure Note    Patient: Nancy Bruno  MRN: [id number]  YOB: 1990  Date of evaluation: 1/31/2024    Procedure Summary       Date: 01/31/24 Room / Location: [facility] OR 08 / [facility] OR    Anesthesia Start: 1831 Anesthesia Stop: 2000    Procedure: Laparoscopic Cholecystectomy Diagnosis:       Acute cholecystitis      (Acute cholecystitis [K81.0])    Surgeons: Sobia Gibson MD Responsible Provider: Ivan Hemphill DO    Anesthesia Type: general ASA Status: 3            Anesthesia Type: No value filed.    Pablo Phase I: Pablo Score: 8    Pablo Phase II:      Anesthesia Post Evaluation    Patient location during evaluation: PACU  Patient participation: complete - patient participated  Level of consciousness: awake and alert  Pain score: 3  Airway patency: patent  Nausea & Vomiting: no nausea and no vomiting  Cardiovascular status: hemodynamically stable and blood pressure returned to baseline  Respiratory status: spontaneous ventilation, room air and acceptable  Hydration status: stable  Pain management: adequate and satisfactory to patient    No notable events documented.

## 2024-02-01 NOTE — PROGRESS NOTES
1958: pt arrives to pacu. Pt on room air and placed on 4L nasal cannula. VSS. Respirations unlabored. X4 lap sites  and one cassie drain on abdomen  2010: pt given 50mcg of fentanyl   2015: pt given 50mcg of fentanyl   2023: report called to [facility]   2035: pt meets discharge  criteria from pacu. Pt transported to  in stable condition

## 2024-02-01 NOTE — PROGRESS NOTES
discomfort in her chest which motivated her to seek evaluation in the ED.  Patient reports a history of watery diarrhea intermittently for 2 weeks, and nausea.  Patient reports she drinks alcohol daily, 2-3 beers per day.  Last night she reports drinking vodka as well.  Has experienced withdrawal symptoms including tremors, irritation, sweating and blackouts related to intoxication in the past; currently experiencing nausea, tremors, sweating, anxiety.  Denies history of seizures related to alcohol use or withdrawal.  Patient reports her chest pressure is mild at this time, however still present.  She denies shortness of breath, abdominal pain, nausea, vomiting at this time, fever, chills.  Patient denies illicit drug use, recent changes in medications or diet.  Patient reports she is wanting to become sober.\"    Hospital course  1/25--> hemodynamically stable, has some tenderness to palpation around the left anterior chest wall, will try Toradol 30 mg x 1 dose now     1/26--> hemodynamically stable; patient complained of dizziness today and orthostatic vital signs were positive so 1 L 0.9 normal saline given     1/28--> patient was found to be orthostatic hypotensive yesterday and complained of some dizziness, last evening she developed a fever of 101.2; she does have a UTI and has been on Rocephin which is susceptible, her CTA chest revealed no thoracic pathology, I ordered chest x-ray, lactic acid which was normal, procalcitonin which is mildly elevated in 2 blood cultures, added on COVID and influenza swabs     1/29--> blood pressure on the lower side however was taking lying on her left side this morning, she has been afebrile for the past 24 hours, on room air; chest x-ray checked yesterday was normal however patient was positive for influenza B and was started on Tamiflu; consulted surgery with her complaints of right upper quadrant pain and states eating makes her pain worse, she also complains of bloating,  2360 ml   Output 170 ml   Net 2190 ml       Diet:  ADULT DIET; Regular    Physical Exam:  BP 97/66   Pulse 63   Temp 97.6 °F (36.4 °C) (Oral)   Resp 17   Ht 1.549 m (5' 1\")   Wt 60.6 kg (133 lb 9.6 oz)   SpO2 97%   BMI 25.24 kg/m²   General appearance: No apparent distress, appears stated age and cooperative.  HEENT: Pupils equal, round, and reactive to light. Conjunctivae/corneas clear.  Neck: Supple, with full range of motion. No jugular venous distention. Trachea midline.  Respiratory:  Normal respiratory effort. Clear to auscultation, bilaterally without Rales/Wheezes/Rhonchi.  Cardiovascular: Regular rate and rhythm with normal S1/S2 without murmurs, rubs or gallops.  Abdomen: Tender, 4 incisions present, no warmth, swelling or  purulent fluid.  Musculoskeletal: passive and active ROM x 4 extremities.  Skin: Skin color, texture, turgor normal.  No rashes or lesions.  Neurologic:  Neurovascularly intact without any focal sensory/motor deficits. Cranial nerves: II-XII intact, grossly non-focal.  Psychiatric: Alert and oriented, thought content appropriate, normal insight  Capillary Refill: Brisk,< 3 seconds   Peripheral Pulses: +2 palpable, equal bilaterally     Labs:   Recent Labs     01/30/24  0946 01/31/24  0635 02/01/24  0934   WBC 4.9 4.4* 5.5   HGB 10.0* 9.8* 9.7*   HCT 30.1* 30.9* 30.4*    159 194     Recent Labs     01/31/24  0635      K 3.8      CO2 25   BUN <2*   CREATININE 0.5   CALCIUM 8.5     No results for input(s): \"AST\", \"ALT\", \"BILIDIR\", \"BILITOT\", \"ALKPHOS\" in the last 72 hours.  No results for input(s): \"INR\" in the last 72 hours.  No results for input(s): \"CKTOTAL\", \"TROPONINI\" in the last 72 hours.    Microbiology:    Blood culture #1:   Lab Results   Component Value Date/Time    BC No growth 24 hours. No growth 48 hours. 01/28/2024 08:05 AM    BC No growth 24 hours. No growth 48 hours. 01/28/2024 08:05 AM       Blood culture #2:No results found for:

## 2024-02-01 NOTE — DISCHARGE INSTRUCTIONS
DR. LUNA DISCHARGE INSTRUCTIONS    Pt Name: Nancy Bruno  Medical Record Number: [id number]  Today's Date: 2/1/2024    GENERAL ANESTHESIA OR SEDATION  1. Do not drive or operate hazardous machinery for 24 hours.  2. Do not make important business or personal decisions for 24 hours.  3. Do not drink alcoholic beverages or use tobacco for 24 hours.    ACTIVITY INSTRUCTIONS:  Ambulate 4 times a day for approximately 10-15  minutes each time     You may resume normal activity tomorrow. Do not engage in strenuous activity that may place stress on your incision.    You may drive when no longer taking pain medication and you are able to comfortably use the gas/brake pedal. Do not drive long distance, in town driving recommended    avoid heavy lifting, tugging, pullings greater than 10-15 lbs for 6 weeks postoperatively, or until released by Physician/CNP      DIET INSTRUCTIONS:  Normal at home diet    MEDICATIONS  You may resume your daily prescription medication schedule unless otherwise specified.      Pain medication at discharge - use only as prescribed- refills may be available to you at your follow up appointments if needed and warranted.  Narcotics should be used for only short term and we highly encouraged our patients to wean off appropriately and use other means for pain such as non pharmacologic measures and over the counter tylenol or ibuprofen if no restrictions apply. We do  know that surgical pain is real and will not hesitate to help eliminate some of your discomfort. However we will not be able to completely make you pain free and it is important to determine what pain level is tolerable for you     Narcotics cause constipation and we recommend taking a colace daily and Miralax if needed to help reduce the risk of constipation    Increasing water intake if no restrictions will also help eliminate constipation    Ambulation 4 times a day 15 minute each time will help reduce pain each day and help

## 2024-02-01 NOTE — CARE COORDINATION
2/1/24, 12:38 PM EST    DISCHARGE ON GOING EVALUATION    [facility] day: 6  Location: 8B-30/030-A Reason for admit: Alcoholism (HCC) [F10.20]  Urinary tract infection without hematuria, site unspecified [N39.0]  Biliary calculus of other site without obstruction [K80.80]  Chest pain in adult [R07.9]  Acute alcoholic gastritis without hemorrhage [K29.20]  Alcohol dependence with withdrawal (HCC) [F10.239]   Procedure: 1-31-24 Laparoscopic Cholecystectomy   Barriers to Discharge: POD #1. Postop pain, Dilaudid and Norco. JAGUAR drain in place. Serosanguinous. Tamiflu for +Flu B. IVF. Reg diet. Ambulate. Resp exercise.   PCP: Shola Davis APRN - CNP  Readmission Risk Score: 4.4%  Patient Goals/Plan/Treatment Preferences: Plans return home with family.

## 2024-02-01 NOTE — OP NOTE
[facility]  RECORD OF OPERATION  PATIENT NAME: Nancy Bruno  MEDICAL RECORD NO. [id number]  SURGEON: Sobia Gibson MD  Primary Care Physician: Shola Davis APRN - CNP     PROCEDURE PERFORMED:  1/31/2024  PREOPERATIVE DIAGNOSIS: cholecystitis   POSTOPERATIVE DIAGNOSIS: Same, path pending  PROCEDURE PERFORMED:  Laparoscopic cholecystectomy.  SURGEON:  Dr. Sobia Gibson   ANESTHESIA:  General with local.    DISCUSSION: Nancy is a 33 y.o. year old female who was seen in evaluation at request of Shola Davis APRN - CNP regarding signs and symptoms, gallbladder disease, radiographic findings of cholecystitis . After history and physical examination was performed treatment options were discussed with the patient. The risks and benefits of surgery were explained to the patient and all questions were answered. The patient elected to proceed with laparoscopic possible open cholecystectomy and informed consent was obtained.     OPERATIVE FINDINGS:    - tense stone filled gallbladder  -cystic duct short and full of stones  - bleeding from cystic artery controlled with clips  - critical view of safety was achieved demonstrating two and only two distinct structures entering the gallbladder and the cystic plate was cleared.      PROCEDURE:  The patient was seen in the preoperative holding area where informed consent was reviewed. They were taken to the operating room, placed on the operating table in supine position. After induction of adequate anesthesia a formal timeout was performed and they were prepped and draped in the normal sterile fashion.  Local anesthetic was instilled at Katz's point and a skin incision was made with an 11 blade scalpel. A Veress needle was inserted, there was good return of air and good fluid drop. Pneumoperitoneum was then established to a pressure of 15mm of Mercury. A 5mm Opti view trocar was then used to enter the abdomen infraumbilically. Initial laparoscopy

## 2024-02-01 NOTE — PROGRESS NOTES
and weight is 60.6 kg (133 lb 9.6 oz). Her oral temperature is 97.6 °F (36.4 °C). Her blood pressure is 97/66 and her pulse is 63. Her respiration is 17 and oxygen saturation is 97%.   Temperature Range (24h):Temp: 97.6 °F (36.4 °C) Temp  Av.1 °F (36.7 °C)  Min: 97.6 °F (36.4 °C)  Max: 98.6 °F (37 °C)  BP Range (24h): Systolic (24hrs), Av , Min:97 , Max:114     Diastolic (24hrs), Av, Min:55, Max:70    Pulse Range (24h): Pulse  Av.2  Min: 56  Max: 84  Respiration Range (24h): Resp  Av.5  Min: 14  Max: 20  Current Pulse Ox (24h):  SpO2: 97 %  Pulse Ox Range (24h):  SpO2  Av.2 %  Min: 93 %  Max: 100 %  Oxygen Amount and Delivery: O2 Flow Rate (L/min): 2 L/min  Incentive Spirometry Tx:       Achieved Volume (mL): 2000 mL    GENERAL: alert, no distress  LUNGS: clear to ausculation, without wheezes, rales or rhonci  HEART: normal rate and regular rhythm  ABDOMEN: tenderness has improved, soft, bowel sounds present in all 4 quadrants, and no guarding or peritoneal signs  Surgical Incision:  looks good no drainage no erythema surgical glue on skin.  JAGUAR drain   EXTREMITY: no cyanosis, clubbing or edema  In: 2360 [P.O.:560; I.V.:1800]  Out: 170 [Drains:20]  Closed/Suction Drain Lateral RLQ Bulb-Output (ml): 20 ml  Date 24 0000 - 24 2359   Shift 6671-2325 7088-9466 1248-6575 24 Hour Total   INTAKE   P.O. 200 240  440   I.V.(mL/kg/hr) 800(1.7)   800   Shift Total(mL/kg) 1000(16.5) 240(4)  1240(20.5)   OUTPUT   Drains 20   20   Shift Total(mL/kg) 20(0.3)   20(0.3)   Weight (kg) 60.6 60.6 60.6 60.6     LABS     Recent Labs     24  0946 24  0635 24  0934   WBC 4.9 4.4* 5.5   HGB 10.0* 9.8* 9.7*   HCT 30.1* 30.9* 30.4*    159 194   NA  --  141  --    K  --  3.8  --    CL  --  104  --    CO2  --  25  --    BUN  --  <2*  --    CREATININE  --  0.5  --    CALCIUM  --  8.5  --       No results for input(s): \"PTT\", \"INR\" in the last 72 hours.    Invalid input(s): \"PT\"  No  results for input(s): \"AST\", \"ALT\", \"BILITOT\", \"BILIDIR\", \"AMYLASE\", \"LIPASE\", \"LDH\", \"LACTA\" in the last 72 hours.    No results for input(s): \"TROPONINT\" in the last 72 hours.      RADIOLOGY     NM HEPATOBILIARY   Final Result   1. Patent common bile duct.   2. Nonvisualization of the gallbladder suspicious for acute cholecystitis.         Final report electronically signed by Dr Chapincito Haley on 1/29/2024 4:48 PM      XR ABDOMEN (KUB) (SINGLE AP VIEW)   Final Result   Mild ileus. Mild hepatomegaly.            **This report has been created using voice recognition software.  It may contain minor errors which are inherent in voice recognition technology.**         Final report electronically signed by Dr. Lee Mcneil on 1/29/2024 10:42 AM      XR CHEST (2 VW)   Final Result   There is no acute intrathoracic process.               **This report has been created using voice recognition software. It may contain minor errors which are inherent in voice recognition technology.**      Final report electronically signed by Dr Chapincito Haley on 1/28/2024 1:00 PM      US GALLBLADDER RUQ   Final Result   1. Cholelithiasis without evidence of acute cholecystitis.   2. Hyperechoic structure in the right hepatic lobe, likely a hemangioma.   3. Probable left hepatic cyst.      Final report electronically signed by Dr. Finesse Chavira on 1/24/2024 3:32 PM      CTA CHEST W WO CONTRAST   Final Result   1. No pulmonary embolism.   2. No acute intrathoracic findings.      Final report electronically signed by Dr. Finesse Chavira on 1/24/2024 3:20 PM            Electronically signed by SHIRA Lombardi - CNP on 2/1/2024 at 12:29 PM

## 2024-02-01 NOTE — PLAN OF CARE
Problem: Discharge Planning  Goal: Discharge to home or other facility with appropriate resources  Outcome: Progressing  Flowsheets (Taken 2/1/2024 1518)  Discharge to home or other facility with appropriate resources: Identify barriers to discharge with patient and caregiver  Note: Patient plans to be discharged to home with family support     Problem: Pain  Goal: Verbalizes/displays adequate comfort level or baseline comfort level  Flowsheets (Taken 2/1/2024 1518)  Verbalizes/displays adequate comfort level or baseline comfort level:   Assess pain using appropriate pain scale   Administer analgesics based on type and severity of pain and evaluate response   Encourage patient to monitor pain and request assistance   Implement non-pharmacological measures as appropriate and evaluate response  Note: Educated on 0-10 pain rating scale. States pain 6/10, 7/10. Educated on prn pain medications ordered. Patient states pain goal is to have no pain. Verbalizes understanding on education provided. Call light in reach.      Problem: Infection - Adult  Goal: Absence of infection at discharge  Outcome: Progressing  Flowsheets (Taken 2/1/2024 1518)  Absence of infection at discharge:   Assess and monitor for signs and symptoms of infection   Monitor lab/diagnostic results   Monitor all insertion sites i.e., indwelling lines, tubes and drains  Note: Monitoring for s/s of infection. No s/s noted at this time. Abnormal results will be reported to provider. Call light in reach.      Problem: Metabolic/Fluid and Electrolytes - Adult  Goal: Electrolytes maintained within normal limits  Outcome: Progressing  Flowsheets (Taken 2/1/2024 1518)  Electrolytes maintained within normal limits: Monitor labs and assess patient for signs and symptoms of electrolyte imbalances  Note: Monitoring labs, vitals as ordered. Will report abnormal labs and vitals. Electrolytes will be replaced as ordered. Call light in reach.    Care plan reviewed

## 2024-02-02 VITALS
HEART RATE: 58 BPM | BODY MASS INDEX: 25.22 KG/M2 | OXYGEN SATURATION: 96 % | WEIGHT: 133.6 LBS | RESPIRATION RATE: 16 BRPM | TEMPERATURE: 98.3 F | HEIGHT: 61 IN | DIASTOLIC BLOOD PRESSURE: 78 MMHG | SYSTOLIC BLOOD PRESSURE: 99 MMHG

## 2024-02-02 PROBLEM — K80.20 GALLSTONES: Status: RESOLVED | Noted: 2024-01-29 | Resolved: 2024-02-02

## 2024-02-02 PROBLEM — K81.0 ACUTE CHOLECYSTITIS: Status: RESOLVED | Noted: 2024-02-01 | Resolved: 2024-02-02

## 2024-02-02 LAB
BACTERIA BLD AEROBE CULT: NORMAL
BACTERIA BLD AEROBE CULT: NORMAL

## 2024-02-02 PROCEDURE — 6360000002 HC RX W HCPCS: Performed by: NURSE PRACTITIONER

## 2024-02-02 PROCEDURE — 6370000000 HC RX 637 (ALT 250 FOR IP)

## 2024-02-02 PROCEDURE — 99239 HOSP IP/OBS DSCHRG MGMT >30: CPT | Performed by: PHYSICIAN ASSISTANT

## 2024-02-02 PROCEDURE — 99024 POSTOP FOLLOW-UP VISIT: CPT | Performed by: NURSE PRACTITIONER

## 2024-02-02 PROCEDURE — 6370000000 HC RX 637 (ALT 250 FOR IP): Performed by: NURSE PRACTITIONER

## 2024-02-02 PROCEDURE — APPSS30 APP SPLIT SHARED TIME 16-30 MINUTES: Performed by: NURSE PRACTITIONER

## 2024-02-02 PROCEDURE — 2580000003 HC RX 258: Performed by: NURSE PRACTITIONER

## 2024-02-02 RX ORDER — HYDROCODONE BITARTRATE AND ACETAMINOPHEN 5; 325 MG/1; MG/1
1 TABLET ORAL EVERY 6 HOURS PRN
Qty: 8 TABLET | Refills: 0 | Status: SHIPPED | OUTPATIENT
Start: 2024-02-02 | End: 2024-02-05

## 2024-02-02 RX ADMIN — PANTOPRAZOLE SODIUM 40 MG: 40 TABLET, DELAYED RELEASE ORAL at 08:55

## 2024-02-02 RX ADMIN — HYDROCODONE BITARTRATE AND ACETAMINOPHEN 1 TABLET: 5; 325 TABLET ORAL at 12:49

## 2024-02-02 RX ADMIN — ESCITALOPRAM OXALATE 20 MG: 20 TABLET ORAL at 08:46

## 2024-02-02 RX ADMIN — FOLIC ACID 1 MG: 1 TABLET ORAL at 08:46

## 2024-02-02 RX ADMIN — Medication 100 MG: at 08:46

## 2024-02-02 RX ADMIN — HYDROCODONE BITARTRATE AND ACETAMINOPHEN 1 TABLET: 5; 325 TABLET ORAL at 06:27

## 2024-02-02 RX ADMIN — Medication 1 TABLET: at 08:46

## 2024-02-02 RX ADMIN — PIPERACILLIN AND TAZOBACTAM 3375 MG: 3; .375 INJECTION, POWDER, FOR SOLUTION INTRAVENOUS at 06:22

## 2024-02-02 ASSESSMENT — PAIN DESCRIPTION - ORIENTATION
ORIENTATION: RIGHT
ORIENTATION: LEFT

## 2024-02-02 ASSESSMENT — PAIN DESCRIPTION - DESCRIPTORS
DESCRIPTORS: ACHING
DESCRIPTORS: ACHING

## 2024-02-02 ASSESSMENT — PAIN SCALES - GENERAL
PAINLEVEL_OUTOF10: 6
PAINLEVEL_OUTOF10: 7
PAINLEVEL_OUTOF10: 7

## 2024-02-02 ASSESSMENT — PAIN DESCRIPTION - LOCATION
LOCATION: ABDOMEN
LOCATION: RIB CAGE
LOCATION: RIB CAGE

## 2024-02-02 ASSESSMENT — PAIN - FUNCTIONAL ASSESSMENT: PAIN_FUNCTIONAL_ASSESSMENT: ACTIVITIES ARE NOT PREVENTED

## 2024-02-02 NOTE — PROGRESS NOTES
Patient discharged home with mother. Educated on discharge instructions, follow ups and medications. No questions or concerns voiced.

## 2024-02-02 NOTE — PROGRESS NOTES
[facility]  General Surgery - Jaki Fernández, APRN - CNP  On behalf of Dr. Sobia Gibson  Daily Progress Note  Pt Name: Nancy Bruno  Medical Record Number: [id number]  Date of Birth 1990   Today's Date: 2/2/2024  Chief complaint: abd pain, gallstones  ASSESSMENT:   S/p Laparoscopic Cholecystectomy POD#2  Postoperative pain as expected   Drain is serosanguinous   Influenza B +    has a past medical history of Abnormal Pap smear of cervix, Anemia, Chlamydia, and Depression.  RECOMMENDATIONS:   IV hydration  Analgesics and antiemetics as needed  Regular diet  Lovenox  for DVT prophylaxis  Increase activity as tolerated   Routine incisional care and drain management   Discharge today  SUBJECTIVE:   Nancy is having postoperative pain as expected. She is tolerating diet. Incisions look good, drain is serosanguinous fluid. Answered all questions. Chart has been reviewed.   MEDICATIONS   Scheduled Meds:   piperacillin-tazobactam  3,375 mg IntraVENous Q8H    polyethylene glycol  17 g Oral Daily    docusate sodium  100 mg Oral BID    escitalopram  20 mg Oral Daily    pantoprazole  40 mg Oral BID AC    [Held by provider] enoxaparin  40 mg SubCUTAneous Q24H    sodium chloride flush  5-40 mL IntraVENous 2 times per day    thiamine  100 mg Oral Daily    multivitamin  1 tablet Oral Daily    folic acid  1 mg Oral Daily     Continuous Infusions:   sodium chloride 100 mL/hr at 02/02/24 0532    sodium chloride       PRN Meds:.morphine **OR** morphine, acetaminophen, HYDROcodone-acetaminophen, HYDROmorphone, hydrOXYzine pamoate, traZODone, potassium chloride **OR** potassium alternative oral replacement **OR** potassium chloride, magnesium sulfate, ondansetron **OR** ondansetron, polyethylene glycol, [DISCONTINUED] acetaminophen **OR** acetaminophen, sodium chloride flush, sodium chloride  OBJECTIVE   CURRENT VITALS:  height is 1.549 m (5' 1\") and weight is 60.6 kg (133 lb 9.6 oz). Her oral temperature is 98.3 °F

## 2024-02-02 NOTE — PLAN OF CARE
Problem: Discharge Planning  Goal: Discharge to home or other facility with appropriate resources  Outcome: Progressing     Problem: Pain  Goal: Verbalizes/displays adequate comfort level or baseline comfort level  Outcome: Progressing  Flowsheets (Taken 2/1/2024 2148)  Verbalizes/displays adequate comfort level or baseline comfort level: Encourage patient to monitor pain and request assistance     Problem: Infection - Adult  Goal: Absence of infection at discharge  Outcome: Progressing  Flowsheets (Taken 2/1/2024 2020)  Absence of infection at discharge: Assess and monitor for signs and symptoms of infection  Goal: Absence of infection during hospitalization  Recent Flowsheet Documentation  Taken 2/1/2024 2020 by Marina Howard RN  Absence of infection during hospitalization: Assess and monitor for signs and symptoms of infection     Problem: Infection - Adult  Goal: Absence of infection during hospitalization  Recent Flowsheet Documentation  Taken 2/1/2024 2020 by Marina Howard RN  Absence of infection during hospitalization: Assess and monitor for signs and symptoms of infection     Problem: Metabolic/Fluid and Electrolytes - Adult  Goal: Electrolytes maintained within normal limits  Outcome: Progressing     Problem: Safety - Adult  Goal: Free from fall injury  Outcome: Progressing

## 2024-02-02 NOTE — DISCHARGE SUMMARY
Hospitalist Discharge Note      Patient:  Nancy Bruno    Unit/Bed:8B-30/030-A  YOB: 1990  MRN: [id number]   Acct: [id number]     PCP: Shola Davis APRN - CNP  Date of Admission: 1/24/2024      Discharge date: 2/2/2024  1:54 PM    Chief Complaint on presentation :-  Chest pain, Abd pain, gallstones      Discharge Assessment and Plan:-   Acute Cholecystitis: POD2. General Surgery managing, they are okay with DC with the JAGUAR drain. JAGUAR drain in place. Pain control is not optimized. Pt is eating regular diet.   - Pt will follow up in their office on Monday to have JAGUAR drain removed.   Chest Pain, acute on chronic: MSK vs GB issues. Resolved.   Influenza B: Not present on admission. Tamiflu.   Hypokalemia--resolved.   Acute alcohol withdrawal--Phenobarbital prophylactic protocol started on 1/24-1/27, on folic acid, multivitamin and thiamine  UTI (POA) with Klebsiella oxytoca--Rocephin 1/24-1/29~sensitive; was given Zosyn 1/29 per surgery  Hyperechoic structure in the right hepatic lobe, likely a hemangioma  Probable left hepatic cyst   Elevated LFTs--possibly secondary to chronic alcohol use, resulted to normal, monitor  Normocytic anemia--stable  Possible gastritis, possibly secondary to alcohol use--takes Prilosec 20 mg twice daily before meals at home  Anxiety/depression--treated    Initial H and P and Hospital course:-  Initial H&P \"Amanda is a 33-year-old  female with a past medical history of anxiety and depression, chronic alcohol use, tobacco use who presents to [facility] ED today for the evaluation of sudden onset chest pain she experienced last night and has been persistent this morning.  Patient reports she was out with a friend, sitting in a car when she had sudden onset substernal chest pain described as \"a ton of bricks sitting on my chest\" that lasted approximately 30 minutes.  Patient reports she had associated right and left arm numbness, pain in her  tablet  HYDROcodone-acetaminophen 5-325 MG per tablet       You can get these medications from any pharmacy    You don't need a prescription for these medications  multivitamin Tabs tablet          Labs :-  Recent Results (from the past 72 hour(s))   Basic Metabolic Panel w/ Reflex to MG    Collection Time: 01/31/24  6:35 AM   Result Value Ref Range    Sodium 141 135 - 145 meq/L    Potassium reflex Magnesium 3.8 3.5 - 5.2 meq/L    Chloride 104 98 - 111 meq/L    CO2 25 23 - 33 meq/L    Glucose 79 70 - 108 mg/dL    BUN <2 (L) 7 - 22 mg/dL    Creatinine 0.5 0.4 - 1.2 mg/dL    Calcium 8.5 8.5 - 10.5 mg/dL   CBC    Collection Time: 01/31/24  6:35 AM   Result Value Ref Range    WBC 4.4 (L) 4.8 - 10.8 thou/mm3    RBC 3.28 (L) 4.20 - 5.40 mill/mm3    Hemoglobin 9.8 (L) 12.0 - 16.0 gm/dl    Hematocrit 30.9 (L) 37.0 - 47.0 %    MCV 94.2 81.0 - 99.0 fL    MCH 29.9 26.0 - 33.0 pg    MCHC 31.7 (L) 32.2 - 35.5 gm/dl    RDW-CV 14.0 11.5 - 14.5 %    RDW-SD 47.8 (H) 35.0 - 45.0 fL    Platelets 159 130 - 400 thou/mm3    MPV 11.0 9.4 - 12.4 fL   Anion Gap    Collection Time: 01/31/24  6:35 AM   Result Value Ref Range    Anion Gap 12.0 8.0 - 16.0 meq/L   Glomerular Filtration Rate, Estimated    Collection Time: 01/31/24  6:35 AM   Result Value Ref Range    Est, Glom Filt Rate >60 >60 ml/min/1.73m2   CBC    Collection Time: 02/01/24  9:34 AM   Result Value Ref Range    WBC 5.5 4.8 - 10.8 thou/mm3    RBC 3.22 (L) 4.20 - 5.40 mill/mm3    Hemoglobin 9.7 (L) 12.0 - 16.0 gm/dl    Hematocrit 30.4 (L) 37.0 - 47.0 %    MCV 94.4 81.0 - 99.0 fL    MCH 30.1 26.0 - 33.0 pg    MCHC 31.9 (L) 32.2 - 35.5 gm/dl    RDW-CV 13.6 11.5 - 14.5 %    RDW-SD 47.3 (H) 35.0 - 45.0 fL    Platelets 194 130 - 400 thou/mm3    MPV 10.6 9.4 - 12.4 fL        Microbiology:    Blood culture #1:   Lab Results   Component Value Date/Time    BC  01/28/2024 08:05 AM     No growth 24 hours. No growth 48 hours. No growth at 5 days    BC  01/28/2024 08:05 AM     No growth 24

## 2024-02-04 DIAGNOSIS — F41.0 GENERALIZED ANXIETY DISORDER WITH PANIC ATTACKS: ICD-10-CM

## 2024-02-04 DIAGNOSIS — F41.1 GENERALIZED ANXIETY DISORDER WITH PANIC ATTACKS: ICD-10-CM

## 2024-02-05 ENCOUNTER — OFFICE VISIT (OUTPATIENT)
Dept: SURGERY | Age: 34
End: 2024-02-05

## 2024-02-05 ENCOUNTER — TELEPHONE (OUTPATIENT)
Dept: FAMILY MEDICINE CLINIC | Age: 34
End: 2024-02-05

## 2024-02-05 VITALS
DIASTOLIC BLOOD PRESSURE: 60 MMHG | BODY MASS INDEX: 25.11 KG/M2 | TEMPERATURE: 97.9 F | HEART RATE: 70 BPM | WEIGHT: 133 LBS | HEIGHT: 61 IN | RESPIRATION RATE: 16 BRPM | SYSTOLIC BLOOD PRESSURE: 118 MMHG | OXYGEN SATURATION: 95 %

## 2024-02-05 DIAGNOSIS — Z90.49 S/P LAPAROSCOPIC CHOLECYSTECTOMY: ICD-10-CM

## 2024-02-05 DIAGNOSIS — G89.18 POST-OP PAIN: Primary | ICD-10-CM

## 2024-02-05 PROCEDURE — 99024 POSTOP FOLLOW-UP VISIT: CPT | Performed by: NURSE PRACTITIONER

## 2024-02-05 RX ORDER — ESCITALOPRAM OXALATE 20 MG/1
20 TABLET ORAL DAILY
Qty: 30 TABLET | Refills: 1 | Status: SHIPPED | OUTPATIENT
Start: 2024-02-05

## 2024-02-05 RX ORDER — HYDROCODONE BITARTRATE AND ACETAMINOPHEN 5; 325 MG/1; MG/1
1 TABLET ORAL EVERY 6 HOURS PRN
Qty: 10 TABLET | Refills: 0 | Status: SHIPPED | OUTPATIENT
Start: 2024-02-05 | End: 2024-02-10

## 2024-02-05 ASSESSMENT — ENCOUNTER SYMPTOMS
VOMITING: 0
SHORTNESS OF BREATH: 0
NAUSEA: 0
ABDOMINAL PAIN: 1

## 2024-02-05 NOTE — TELEPHONE ENCOUNTER
Care Transitions Initial Follow Up Call    Outreach made within 2 business days of discharge: Yes    Patient: Nancy Bruno Patient : 1990   MRN: [id number]  Reason for Admission: There are no discharge diagnoses documented for the most recent discharge.  Discharge Date: 24       Spoke with: Nancy     Discharge department/facility: [facility]     TCM Interactive Patient Contact:  Was patient able to fill all prescriptions: Yes  Was patient instructed to bring all medications to the follow-up visit: Yes  Is patient taking all medications as directed in the discharge summary? Yes  Does patient understand their discharge instructions: Yes  Does patient have questions or concerns that need addressed prior to 7-14 day follow up office visit: no    Scheduled appointment with PCP within 7-14 days    Follow Up  Future Appointments   Date Time Provider Department Center   2024 11:00 AM Jaki Fernández APRN - CNP [facility] [place]   2024  9:40 AM Shola Davis APRN - CNP [facility] LUISA Lee LPN

## 2024-02-05 NOTE — PROGRESS NOTES
[facility] [place] [facility]  [facility] GENERAL SURGERY  [address]  [place] [facility] [address]  Dept: [phone]  Dept Fax: [phone]  Loc: [phone]    Visit Date: 2024       Nancy Bruno is a 33 y.o. female who presents today for:  Chief Complaint   Patient presents with    Post-Op Check     S/p Laparoscopic cholecystectomy 24-Drain removal       HPI:     Nancy presents today for a post op check.  Today She complains of  substernal tenderness.  She is otherwise doing okay.  She is still taking pain medication, she states she is not drinking alcohol or smoking.  She has a JAGUAR drain that is serosanguinous in color. Removed at visit.  There is crepitus in the substernal region near incision. Incisions look good, no concerns.  She is tolerating meals, denies N&V.  Reviewed surgical pathology    FINAL DIAGNOSIS:   Gallbladder, cholecystectomy:     Chronic cholecystitis.     Cholelithiasis       Past Medical History:   Diagnosis Date    Abnormal Pap smear of cervix     LSIL, pt never returned for colp    Anemia     during and outside of pregnancies    Chlamydia     Depression     was taking medication      Past Surgical History:   Procedure Laterality Date     SECTION      x3    CHOLECYSTECTOMY, LAPAROSCOPIC N/A 2024    Laparoscopic Cholecystectomy performed by Sobia Gibson MD at [facility] OR     Family History   Problem Relation Age of Onset    Heart Disease Father      Social History     Tobacco Use    Smoking status: Former     Current packs/day: 0.00     Average packs/day: 0.3 packs/day for 5.0 years (1.3 ttl pk-yrs)     Types: Cigarettes     Start date:      Quit date: 2020     Years since quittin.0    Smokeless tobacco: Never   Substance Use Topics    Alcohol use: Yes        Current Outpatient Medications   Medication Sig Dispense Refill    escitalopram (LEXAPRO) 20 MG tablet take 1 tablet by mouth once daily 30 tablet 1

## 2024-02-05 NOTE — TELEPHONE ENCOUNTER
Recent Visits  Date Type Provider Dept   08/21/23 Office Visit Shola Davis APRN - CNP [facility]   05/30/23 Office Visit Shola Davis APRN - CNP [facility]   04/12/23 Office Visit Shola Davis APRN - CNP [facility]   Showing recent visits within past 540 days with a meds authorizing provider and meeting all other requirements  Future Appointments  Date Type Provider Dept   02/07/24 Appointment Shola Davis APRN - CNP [facility]   Showing future appointments within next 150 days with a meds authorizing provider and meeting all other requirements

## 2024-02-08 ENCOUNTER — TELEPHONE (OUTPATIENT)
Dept: FAMILY MEDICINE CLINIC | Age: 34
End: 2024-02-08

## 2024-02-08 DIAGNOSIS — F10.930 ALCOHOL WITHDRAWAL SYNDROME WITHOUT COMPLICATION (HCC): Primary | ICD-10-CM

## 2024-02-08 RX ORDER — HYDROXYZINE HYDROCHLORIDE 25 MG/1
25 TABLET, FILM COATED ORAL EVERY 6 HOURS PRN
Qty: 120 TABLET | Refills: 0 | Status: SHIPPED | OUTPATIENT
Start: 2024-02-08

## 2024-02-08 NOTE — TELEPHONE ENCOUNTER
Pt was recently discharged from the hospital, during her stay she was given medication to help with her withdrawal symptoms from alcohol. Pt stated this did help. Now that she has been discharged from the hospital she is asking for a medication to help with her alcohol withdrawal. Pt is shaky, easily irritated, and continues to pace to keep her mind off of drinking.    Please advise    [facility] on [address]

## 2024-02-08 NOTE — TELEPHONE ENCOUNTER
I did send a Rx to [facility]. I would really rec'd she f/u with Bryan though. They do treat Alcohol withdrawal/addiction.

## 2024-02-09 ENCOUNTER — TELEPHONE (OUTPATIENT)
Dept: SURGERY | Age: 34
End: 2024-02-09

## 2024-02-09 NOTE — TELEPHONE ENCOUNTER
Pt called stating she was having chest tightness, nausea, headache, light headed. Per Dr Gibson pt to go to ER. Pt advised and verbalized understanding.

## 2024-02-15 ENCOUNTER — APPOINTMENT (OUTPATIENT)
Dept: CT IMAGING | Age: 34
End: 2024-02-15
Payer: COMMERCIAL

## 2024-02-15 ENCOUNTER — OFFICE VISIT (OUTPATIENT)
Dept: SURGERY | Age: 34
End: 2024-02-15

## 2024-02-15 ENCOUNTER — HOSPITAL ENCOUNTER (EMERGENCY)
Age: 34
Discharge: HOME | End: 2024-02-15
Attending: EMERGENCY MEDICINE
Payer: COMMERCIAL

## 2024-02-15 VITALS
DIASTOLIC BLOOD PRESSURE: 76 MMHG | RESPIRATION RATE: 17 BRPM | HEART RATE: 57 BPM | TEMPERATURE: 98 F | WEIGHT: 132 LBS | SYSTOLIC BLOOD PRESSURE: 107 MMHG | OXYGEN SATURATION: 98 % | BODY MASS INDEX: 25.91 KG/M2 | HEIGHT: 60 IN

## 2024-02-15 VITALS
WEIGHT: 132.7 LBS | OXYGEN SATURATION: 100 % | BODY MASS INDEX: 25.05 KG/M2 | SYSTOLIC BLOOD PRESSURE: 82 MMHG | HEART RATE: 66 BPM | DIASTOLIC BLOOD PRESSURE: 41 MMHG | HEIGHT: 61 IN | TEMPERATURE: 97.6 F

## 2024-02-15 DIAGNOSIS — N39.0 URINARY TRACT INFECTION WITHOUT HEMATURIA, SITE UNSPECIFIED: Primary | ICD-10-CM

## 2024-02-15 DIAGNOSIS — Z90.49 S/P LAPAROSCOPIC CHOLECYSTECTOMY: Primary | ICD-10-CM

## 2024-02-15 DIAGNOSIS — R53.83 OTHER FATIGUE: ICD-10-CM

## 2024-02-15 DIAGNOSIS — R42 LIGHTHEADEDNESS: ICD-10-CM

## 2024-02-15 DIAGNOSIS — I95.9 HYPOTENSION, UNSPECIFIED HYPOTENSION TYPE: ICD-10-CM

## 2024-02-15 LAB
ALBUMIN SERPL BCG-MCNC: 3.8 G/DL (ref 3.5–5.1)
ALP SERPL-CCNC: 53 U/L (ref 38–126)
ALT SERPL W/O P-5'-P-CCNC: 20 U/L (ref 11–66)
ANION GAP SERPL CALC-SCNC: 10 MEQ/L (ref 8–16)
AST SERPL-CCNC: 30 U/L (ref 5–40)
BACTERIA URNS QL MICRO: ABNORMAL /HPF
BASOPHILS ABSOLUTE: 0 THOU/MM3 (ref 0–0.1)
BASOPHILS NFR BLD AUTO: 0.7 %
BILIRUB SERPL-MCNC: 0.3 MG/DL (ref 0.3–1.2)
BILIRUB UR QL STRIP.AUTO: NEGATIVE
BUN SERPL-MCNC: 10 MG/DL (ref 7–22)
CALCIUM SERPL-MCNC: 8.3 MG/DL (ref 8.5–10.5)
CASTS #/AREA URNS LPF: ABNORMAL /LPF
CASTS 2: ABNORMAL /LPF
CHARACTER UR: ABNORMAL
CHLORIDE SERPL-SCNC: 104 MEQ/L (ref 98–111)
CO2 SERPL-SCNC: 22 MEQ/L (ref 23–33)
COLOR: YELLOW
CREAT SERPL-MCNC: 0.6 MG/DL (ref 0.4–1.2)
CRYSTALS URNS MICRO: ABNORMAL
D DIMER PPP IA.FEU-MCNC: 743 NG/ML FEU (ref 0–500)
DEPRECATED RDW RBC AUTO: 49.5 FL (ref 35–45)
EOSINOPHIL NFR BLD AUTO: 1 %
EOSINOPHILS ABSOLUTE: 0 THOU/MM3 (ref 0–0.4)
EPITHELIAL CELLS, UA: ABNORMAL /HPF
ERYTHROCYTE [DISTWIDTH] IN BLOOD BY AUTOMATED COUNT: 14.1 % (ref 11.5–14.5)
GFR SERPL CREATININE-BSD FRML MDRD: > 60 ML/MIN/1.73M2
GLUCOSE SERPL-MCNC: 77 MG/DL (ref 70–108)
GLUCOSE UR QL STRIP.AUTO: NEGATIVE MG/DL
HCG UR QL: NEGATIVE
HCT VFR BLD AUTO: 33.5 % (ref 37–47)
HGB BLD-MCNC: 10.4 GM/DL (ref 12–16)
HGB UR QL STRIP.AUTO: ABNORMAL
IMM GRANULOCYTES # BLD AUTO: 0.01 THOU/MM3 (ref 0–0.07)
IMM GRANULOCYTES NFR BLD AUTO: 0.2 %
KETONES UR QL STRIP.AUTO: ABNORMAL
LYMPHOCYTES ABSOLUTE: 1.6 THOU/MM3 (ref 1–4.8)
LYMPHOCYTES NFR BLD AUTO: 41 %
MCH RBC QN AUTO: 29.3 PG (ref 26–33)
MCHC RBC AUTO-ENTMCNC: 31 GM/DL (ref 32.2–35.5)
MCV RBC AUTO: 94.4 FL (ref 81–99)
MISCELLANEOUS 2: ABNORMAL
MONOCYTES ABSOLUTE: 0.4 THOU/MM3 (ref 0.4–1.3)
MONOCYTES NFR BLD AUTO: 9.7 %
NEUTROPHILS NFR BLD AUTO: 47.4 %
NITRITE UR QL STRIP: NEGATIVE
NRBC BLD AUTO-RTO: 0 /100 WBC
OSMOLALITY SERPL CALC.SUM OF ELEC: 269.8 MOSMOL/KG (ref 275–300)
PH UR STRIP.AUTO: 5.5 [PH] (ref 5–9)
PLATELET # BLD AUTO: 232 THOU/MM3 (ref 130–400)
PMV BLD AUTO: 10.2 FL (ref 9.4–12.4)
POTASSIUM SERPL-SCNC: 5.2 MEQ/L (ref 3.5–5.2)
PROT SERPL-MCNC: 6.9 G/DL (ref 6.1–8)
PROT UR STRIP.AUTO-MCNC: NEGATIVE MG/DL
RBC # BLD AUTO: 3.55 MILL/MM3 (ref 4.2–5.4)
RBC URINE: ABNORMAL /HPF
RENAL EPI CELLS #/AREA URNS HPF: ABNORMAL /[HPF]
SEGMENTED NEUTROPHILS ABSOLUTE COUNT: 1.9 THOU/MM3 (ref 1.8–7.7)
SODIUM SERPL-SCNC: 136 MEQ/L (ref 135–145)
SP GR UR REFRACT.AUTO: 1.02 (ref 1–1.03)
UROBILINOGEN, URINE: 0.2 EU/DL (ref 0–1)
WBC # BLD AUTO: 4 THOU/MM3 (ref 4.8–10.8)
WBC #/AREA URNS HPF: > 200 /HPF
WBC #/AREA URNS HPF: ABNORMAL /[HPF]
YEAST LIKE FUNGI URNS QL MICRO: ABNORMAL

## 2024-02-15 PROCEDURE — 81001 URINALYSIS AUTO W/SCOPE: CPT

## 2024-02-15 PROCEDURE — 36415 COLL VENOUS BLD VENIPUNCTURE: CPT

## 2024-02-15 PROCEDURE — 99285 EMERGENCY DEPT VISIT HI MDM: CPT

## 2024-02-15 PROCEDURE — 71275 CT ANGIOGRAPHY CHEST: CPT

## 2024-02-15 PROCEDURE — 85379 FIBRIN DEGRADATION QUANT: CPT

## 2024-02-15 PROCEDURE — 87077 CULTURE AEROBIC IDENTIFY: CPT

## 2024-02-15 PROCEDURE — 81025 URINE PREGNANCY TEST: CPT

## 2024-02-15 PROCEDURE — 80053 COMPREHEN METABOLIC PANEL: CPT

## 2024-02-15 PROCEDURE — 99024 POSTOP FOLLOW-UP VISIT: CPT | Performed by: NURSE PRACTITIONER

## 2024-02-15 PROCEDURE — 96360 HYDRATION IV INFUSION INIT: CPT

## 2024-02-15 PROCEDURE — 85025 COMPLETE CBC W/AUTO DIFF WBC: CPT

## 2024-02-15 PROCEDURE — 6360000004 HC RX CONTRAST MEDICATION: Performed by: EMERGENCY MEDICINE

## 2024-02-15 PROCEDURE — 2580000003 HC RX 258: Performed by: PHYSICIAN ASSISTANT

## 2024-02-15 PROCEDURE — 96361 HYDRATE IV INFUSION ADD-ON: CPT

## 2024-02-15 PROCEDURE — 87186 SC STD MICRODIL/AGAR DIL: CPT

## 2024-02-15 PROCEDURE — 87086 URINE CULTURE/COLONY COUNT: CPT

## 2024-02-15 RX ORDER — ONDANSETRON 4 MG/1
4 TABLET, FILM COATED ORAL 3 TIMES DAILY
COMMUNITY
Start: 2024-02-12

## 2024-02-15 RX ORDER — CLONIDINE HYDROCHLORIDE 0.1 MG/1
0.1 TABLET ORAL 2 TIMES DAILY PRN
COMMUNITY
Start: 2024-02-12 | End: 2024-02-16 | Stop reason: SINTOL

## 2024-02-15 RX ORDER — 0.9 % SODIUM CHLORIDE 0.9 %
1000 INTRAVENOUS SOLUTION INTRAVENOUS ONCE
Status: COMPLETED | OUTPATIENT
Start: 2024-02-15 | End: 2024-02-15

## 2024-02-15 RX ORDER — NALTREXONE 380 MG
380 KIT INTRAMUSCULAR ONCE
COMMUNITY
Start: 2024-02-13

## 2024-02-15 RX ADMIN — SODIUM CHLORIDE 1000 ML: 9 INJECTION, SOLUTION INTRAVENOUS at 15:07

## 2024-02-15 RX ADMIN — IOPAMIDOL 80 ML: 755 INJECTION, SOLUTION INTRAVENOUS at 20:35

## 2024-02-15 RX ADMIN — SODIUM CHLORIDE 1000 ML: 9 INJECTION, SOLUTION INTRAVENOUS at 17:31

## 2024-02-15 NOTE — ED PROVIDER NOTES
[facility] EMERGENCY DEPT  EMERGENCY DEPARTMENT ENCOUNTER      Pt Name: Nancy Bruno  MRN: [id number]  Birthdate 1990  Date of evaluation: 2/15/2024  Provider: Jamir Kolb PA-C    CHIEF COMPLAINT     Chief Complaint   Patient presents with    Hypotension       HISTORY OF PRESENT ILLNESS    Nancy Bruno is a 33 y.o. female who presents to the emergency department with complaints of low blood pressure.  Patient was at her surgeon's office today for a postop follow-up when she was telling the staff that she was feeling fatigued and lightheaded.  They checked her blood pressure and upon arrival it was in the 70s systolic.  Apparently checked 2 more times and it was in the 80s.  Therefore they sent her to the ED for evaluation.  Patient does complain of feeling generalized fatigue and just overall tired.  She states that she does feel a bit lightheaded when she stands up.  Patient states that she may be dehydrated.  That she is been try to drink fluids but is not drinking a lot.  Patient also states that she was recently put on clonidine for alcohol treatment.  Patient states she did not take the clonidine today.  She denies any chest pain or palpitations.  Denies any fevers or chills.  Denies coughing or wheezing or any other ill symptoms.      Triage notes and Nursing notes were reviewed by myself.  Any discrepancies are addressed above.    PAST MEDICAL HISTORY     Past Medical History:   Diagnosis Date    Abnormal Pap smear of cervix     LSIL, pt never returned for colp    Anemia     during and outside of pregnancies    Chlamydia     Depression     was taking medication       SURGICAL HISTORY       Past Surgical History:   Procedure Laterality Date     SECTION      x3    CHOLECYSTECTOMY, LAPAROSCOPIC N/A 2024    Laparoscopic Cholecystectomy performed by Sobia Gibson MD at [facility] OR       CURRENT MEDICATIONS       Previous Medications    ACETAMINOPHEN (TYLENOL) 325 MG TABLET

## 2024-02-15 NOTE — ED TRIAGE NOTES
Pt to ED from a follow up appointment with surgeon. Pt was told to come here because her blood pressure was low. Pt states that she feels fine.

## 2024-02-15 NOTE — ED NOTES
Pt calm and resting in room, call light within reach, side rails up x2, respirations unlabored. Pt has no further needs or complaints at this time.

## 2024-02-15 NOTE — PROGRESS NOTES
[facility] [place] [facility]  [facility] GENERAL SURGERY  [address]  [place] [address]  Dept: [phone]  Dept Fax: [phone]  Loc: [phone]    Visit Date: 2/15/2024       Nancy Bruno is a 33 y.o. female who presents today for:  Chief Complaint   Patient presents with    Post-Op Check     S/p Laparoscopic cholecystectomy 24-Last seen 24-Drain removed       HPI:     Nancy presents today for a post op check.  Today She complains of painful urination.  She states she also had a UTI in the hospital, she was treated with rocephin and Omnicef at discharge.  Her BP on arrival to office  was 70/42.  She states she is eating and drinking a lot of water. She denies N&V.  She was recently put on naltrexone for alcohol abuse.  She states she has had a lot of lightheadedness and dizziness recently.  She did call the office on  with same symptoms with chest pain and was advised to go to ER.  However she couldn't go because she had her kids. BP recheck was slightly better 84/40 and then third BP standing was 82/41.  Recommended she go to ER with symptomatic BP.   The incisions look good, no concerns.  From a surgical standpoint she is doing better and will not need a follow up appt.  She ambulated to ER with Staff member. She needed a return to work slip filled out or she would get fired.  Recommend 6 weeks of no heavy lifting , however she needs to job.  Okay to return to work at full capacity       FINAL DIAGNOSIS:   Gallbladder, cholecystectomy:     Chronic cholecystitis.     Cholelithiasis.       Past Medical History:   Diagnosis Date    Abnormal Pap smear of cervix     LSIL, pt never returned for colp    Anemia     during and outside of pregnancies    Chlamydia     Depression     was taking medication      Past Surgical History:   Procedure Laterality Date     SECTION      x3    CHOLECYSTECTOMY, LAPAROSCOPIC N/A 2024    Laparoscopic

## 2024-02-16 RX ORDER — PHENAZOPYRIDINE HYDROCHLORIDE 100 MG/1
100 TABLET, FILM COATED ORAL 3 TIMES DAILY PRN
Qty: 9 TABLET | Refills: 0 | Status: SHIPPED | OUTPATIENT
Start: 2024-02-16 | End: 2024-02-19

## 2024-02-16 RX ORDER — CEPHALEXIN 500 MG/1
500 CAPSULE ORAL 4 TIMES DAILY
Qty: 28 CAPSULE | Refills: 0 | Status: SHIPPED | OUTPATIENT
Start: 2024-02-16 | End: 2024-02-16

## 2024-02-16 RX ORDER — CIPROFLOXACIN 500 MG/1
500 TABLET, FILM COATED ORAL 2 TIMES DAILY
Qty: 14 TABLET | Refills: 0 | Status: SHIPPED | OUTPATIENT
Start: 2024-02-16 | End: 2024-02-23

## 2024-02-16 NOTE — ED NOTES
Pt in bed watching tv with lights dimmed for comfort. Updated on plan of care. Voiced no needs. Call light in reach.

## 2024-02-16 NOTE — ED PROVIDER NOTES
Sign out from KEVON Landers 33 year old, female, recently post lab cholecystectomy (1/31/24)  She was seen today in her surgeon's office, noted to be hypotensive and was referred to the emergency department for evaluation.  Patient was seen by physician assistant in triage, I was asked to get involved with patient care as a liter of IV fluids did not normalize the patient's blood pressure.  On exam, she is pleasant, alert, in no acute distress.  Blood pressure is 94/55  She states is had some dysuria and burning in urination recently.  Record review reveals her to have had a UTI in the hospital which was treated with Rocephin and Omnicef on discharge.  She denies any vaginal bleeding or vaginal discharge.  She denies any fevers or chills, denies any nausea or vomiting.  She has not had any lower extremity pain or swelling.  She does state that she chronically has lightheadedness, particularly when she attempts to stand she feels very lightheaded at times, which subsides over a few minutes but this has been going for quite some time.  She certainly has noticed that this has worsened in the last 2 days to the past week or so.  She was prescribed clonidine at [facility] for alcohol withdrawal symptoms which she has been taking daily.  Her last dose was earlier today.    HEENT WNL  Neck supple, no JVD  Lungs clear, HRRR no mcg  Abd S/ND; mild tenderness at the incision sites however surgical site c/d/i  LE's no edema  Neuro WNL    A/P:  Hypotension: Suspect multifactorial:   Medication-related: Stop Clonidine.  Patient received 2 L IV fluids with normalized blood pressure was monitored for several hours during that time with blood pressure normalized.  Advised her to increase fluid intake  Urinary tract infection: I doubt sepsis or pyelonephritis, she has no CVA tenderness, her laboratory workup is reassuring.  Start her on Cipro and Pyridium given that she is symptomatic and given she appears to have failed

## 2024-02-16 NOTE — ED NOTES
Pt in bed watching tv with no s/s of distress noted. Updated on plan of care. Voiced no needs. Call light in reach.

## 2024-02-16 NOTE — DISCHARGE INSTRUCTIONS
Return to the Emergency Department immediately if you develop lightheadedness, dizziness, abdominal pain, chest pain, shortness of breath, or you have any other concerns.  Please follow up with your primary care doctor in 2-3 days.

## 2024-02-16 NOTE — ED NOTES
Chief complaint:   Chief Complaint   Patient presents with   • Injections     Hip injection        Vitals:  Visit Vitals  Pulse 67   Resp 16   Ht 5' 4\" (1.626 m)   Wt 77.1 kg (170 lb)   LMP 04/12/2017 (Approximate)   BMI 29.18 kg/m²       HISTORY OF PRESENT ILLNESS     This patient is a 57 year old female presenting today with c/o's (complaints) of right hip pain.  The patient has a history of recurrent bilateral trochanteric bursitis.  She has been having increasing pain recently over her right trochanter.  She is always responded well to therapeutic steroid injections for her bursitis.  Currently her pain is sufficient to interfere with her sleep and she is unable to lay on that side due to pain...        Other significant problems:  Patient Active Problem List    Diagnosis Date Noted   • Coronary artery calcification 12/02/2021     Priority: Low     EKG/Treadmill Stress Test 10/11/2019:  Nonischemic response electrocardiographically   Nonischemic response subjectively   Normal blood pressure response to exercise   Normal heart rate recovery   Good/Excellent exercise capacity, achieving 11.7 METS     CT Calcium Score 2/5/2020:  FINDINGS:  Total Agatston Coronary Calcium Score: 13  Percentile: 80th  LMA: 18  LAD: 0  LCX: 0  RCA: 0  Ascending Aorta: 3.5 cm  Descending Aorta: 2.2 cm  Main Pulmonary Artery: 2.3 cm  Extra-coronary vascular calcification: There is very mild calcification in  the thoracic aorta  Pericardium: Normal.  IMPRESSION:  1.  Total coronary artery calcium score is 13. 80% of people matched for  age, gender, and race/ethnicity who are free of clinical cardiovascular  disease and treated diabetes had less calcium than was detected in this  study.  2.  There is very mild calcification in the thoracic aorta      • Familial hyperlipidemia 12/02/2021     Priority: Low     5/24/2017 Lipid Panel: Chol 202, , HDL 50, .   4/4/2018 Lipid Panel: Chol 219, , HDL 46, .  12/19/2019  Pt aware of pending discharge. Verbalized understanding and voiced no needs. Call light in reach.   Lipid Panel: Chol 223, , HDL 49, .  2/25/2020 Lipid Panel: Chol 167, , HDL 51, TG 65.  5/27/2020 Lipid Panel: Chol 216, , HDL 48, .  12/16/2020 Lipid Panel: Chol 295, , HDL 55, .  10/4/2021 Lipid Panel: Chol 191, , HDL 50, TG 87.  8/03/2023 Lipid Panel: Chol 255, , HDL 47,      • Acute midline thoracic back pain 07/20/2021     Priority: Low     Back pain located between scapula, onset 2 days ago, woke up with the pain. She has not had this pain before. She has been doing neck exercises for her neck pain. She also does work lifting patients (transferring) on the weekend. She has taken ibuprofen 800mg twice daily for pain with some relief. She had OMT done to her back yesterday without any improvement today, but did have some relief during the treatment. She denies chest pain, difficulty breathing, numbness, tingling or radiation of pain. She has had imaging of C spine showing degenerative changes at C5-6, has not had thoracic imaging before.     • Hematuria 11/18/2019     Priority: Low     Needs repeat testing Oct 2020     • Acute midline low back pain without sciatica 08/15/2019     Priority: Low   • Borderline ascending aorta dilatation 04/22/2016     Priority: Low     Echo 2/24/2016:  Normal biventricular size and systolic function, no LV regional wall motion abnormalities; LVEF= 58%.  Borderline LVH with normal diastolic function.  Mild biatrial enlargement, LA volume index= 40 ml/m².  No significant valve abnormalities.  Borderline dilated ascending aorta (3.6 cm).    CT Calcium Score 2/5/2020:  Ascending Aorta: 3.5 cm     • Palpitations      Priority: Low   • Left foot pain 10/23/2015     Priority: Low   • History of basal cell carcinoma on right anterior arm excised 09/2015 09/14/2015     Priority: Low   • HAV (hallux abducto valgus) 09/14/2015     Priority: Low   • Menorrhagia 03/27/2015     Priority: Low   • Endometrial thickening on ultra  sound 03/27/2015     Priority: Low   • Frontal sinusitis 02/28/2015     Priority: Low   • HA (headache) 02/28/2015     Priority: Low   • Bronchitis 02/28/2015     Priority: Low   • Anxiety      Priority: Low   • History of hyperthyroidism      Priority: Low     Treated with radioactive iodine     • Hypothyroidism (acquired)      Priority: Low   • Rectal bleeding      Priority: Low     Infrequent     • Sinus headache      Priority: Low   • Esophageal reflux      Priority: Low       PAST MEDICAL, FAMILY AND SOCIAL HISTORY     Medications:  Current Outpatient Medications   Medication Sig Dispense Refill   • fluticasone (FLONASE) 50 MCG/ACT nasal spray Spray 1 spray in each nostril 2 times daily. 16 g 1   • clobetasol (TEMOVATE) 0.05 % topical solution Apply 2 times daily to the scalp for 5 days, then 2-3 times weekly to maintain effect. Place 5-6 drops on scalp (best if scalp wet), then rub in. 50 mL 2   • polyethylene glycol (MIRALAX) 17 GM/SCOOP powder Take 17 g by mouth daily. Stir and dissolve powder in any 4 to 8 ounces of beverage, then drink. 238 g 11   • sharps container use as directed 1 each 0   • Alcohol Swabs (B-D SINGLE USE SWABS REGULAR) Pads use as directed 30 each 0   • EVOLocumab (REPATHA SURECLICK) 140 MG/ML injection Inject 1 pen into the skin every 14 days. 2 mL 11   • terbinafine (LamISIL) 250 MG tablet Take 1 tablet by mouth daily. For nail fungus. 90 tablet 1   • estradiol (ESTRACE) 0.1 MG/GM vaginal cream Place 1 g vaginally 2 days a week. 42.5 g 5   • hydrOXYzine (ATARAX) 25 MG tablet Take 1-2 tablets by mouth nightly as needed for insomnia. 60 tablet 1   • omeprazole (PriLOSEC) 40 MG capsule Take 1 capsule by mouth daily. 90 capsule 3   • levothyroxine 100 MCG tablet Take 1 tablet by mouth daily. 90 tablet 0   • sertraline (ZOLOFT) 100 MG tablet Take 1 tablet by mouth daily. 90 tablet 1   • ALPRAZolam (XANAX) 1 MG tablet Take 1 tablet by mouth in the morning and 1 tablet in the evening. Do not  start before August 17, 2023. 60 tablet 3   • aspirin (Aspirin Low Dose) 81 MG EC tablet Take 1 tablet by mouth daily. Keep upcoming appointment for more refills. 90 tablet 0   • azelastine (ASTELIN) 0.1 % nasal spray Spray 1 spray in each nostril in the morning and 1 spray in the evening. 30 mL 12   • albuterol 108 (90 Base) MCG/ACT inhaler Inhale 2 puffs into the lungs every 4 hours as needed for Shortness of Breath or Wheezing. 18 g 3   • ibuprofen (MOTRIN) 800 MG tablet Take 1 tablet by mouth in the morning and 1 tablet in the evening with food. Please make an appointment for refills. 60 tablet 0   • valACYclovir (VALTREX) 500 MG tablet Take 1 tablet by mouth 2 times every day as needed. 60 tablet 6   • loratadine (CLARITIN) 10 MG tablet Take 1 tablet by mouth daily. 90 tablet 1   • cyclobenzaprine (FLEXERIL) 10 MG tablet Take 1 tablet by mouth at bedtime. 30 tablet 2   • ketoconazole (NIZORAL) 2 % cream Apply 1 application topically 2 times daily. 30 g 0   • triamcinolone (ARISTOCORT) 0.1 % ointment Apply 1 application topically 2 times daily. Until itching and redness is gone 30 g 0   • lidocaine (LIDODERM) 5 % Place 1 patch onto the skin every 24 hours. Remove patch 12 hours after applying 30 patch 1   • Biotin 10 MG Tab      • Omega-3 Fatty Acids (FISH OIL) 1000 MG capsule Take 2 g by mouth daily. 90 capsule 3     No current facility-administered medications for this visit.       Allergies:  ALLERGIES:  No Known Allergies    Past Medical  History/Surgeries:  Past Medical History:   Diagnosis Date   • Anxiety    • Arthritis    • Chronic pain     right tennis elbow   • Depression    • Esophageal reflux    • History of basal cell carcinoma on right anterior arm excised 09/2015    • History of hyperthyroidism     Treated with radioactive iodine   • Hypothyroidism (acquired)    • Murmur     Since childhood   • Sinusitis, chronic        Past Surgical History:   Procedure Laterality Date   • Colonoscopy diagnostic   10/06/2012    10 Year Recall/ Normal/ Dr. Ritter   • Colonoscopy diagnostic  2023    10YR RECALL   • Esophagogastroduodenoscopy transoral flex diag  2023   • Hysteroscopy, diagnostic  2015   • Mandible fracture surgery  1983   • Tubal ligation  1998       Family History:  Family History   Problem Relation Age of Onset   • Cancer Father         throat CA, head/neck   • Cancer Sister 67        metastatic unknown origin   • Heart Brother    • Heart disease Brother    • Stroke Brother 72   • Thyroid Brother    • Cancer, Prostate Brother 60   • Stroke Brother    • Heart disease Maternal Grandmother    • Diabetes Maternal Grandmother    • Cancer Maternal Aunt 60        ovarian   • Cancer Maternal Aunt         lung   • Atrial Fibrilliation Maternal Aunt    • Anxiety disorder Daughter    • Anxiety disorder Daughter        Social History:  Social History     Tobacco Use   • Smoking status: Former     Current packs/day: 0.00     Average packs/day: 0.5 packs/day for 46.1 years (23.0 ttl pk-yrs)     Types: Cigarettes     Start date: 11/3/1976     Quit date: 2022     Years since quittin.9   • Smokeless tobacco: Never   Substance Use Topics   • Alcohol use: Not Currently     Comment: Occasionally       REVIEW OF SYSTEMS     Review of Systems   Constitutional: Positive for activity change.   Musculoskeletal: Positive for arthralgias. Negative for back pain, gait problem, joint swelling and myalgias.   Psychiatric/Behavioral: Positive for sleep disturbance.       PHYSICAL EXAM     Physical Exam  Constitutional:       General: She is not in acute distress.     Appearance: Normal appearance. She is well-developed. She is not ill-appearing, toxic-appearing or diaphoretic.   HENT:      Head: Normocephalic and atraumatic.      Neck: Normal range of motion and neck supple. No rigidity.   Eyes:      General: No scleral icterus.        Right eye: No discharge.         Left eye: No discharge.      Extraocular  Movements: Extraocular movements intact.      Conjunctiva/sclera: Conjunctivae normal.      Pupils: Pupils are equal, round, and reactive to light.   Cardiovascular:      Rate and Rhythm: Normal rate and regular rhythm.   Pulmonary:      Effort: Pulmonary effort is normal. No respiratory distress.   Abdominal:      Palpations: Abdomen is soft.      Tenderness: There is no abdominal tenderness.   Musculoskeletal:         General: Tenderness present. No swelling, deformity or signs of injury. Normal range of motion.      Right lower leg: No edema.      Left lower leg: No edema.      Comments: Point tenderness over the right greater trochanter which reproduces the patient's pain.  There is no redness or swelling over the site.  There is no bony defect.   Skin:     General: Skin is warm.      Coloration: Skin is not jaundiced or pale.      Findings: No bruising, erythema, lesion or rash.   Neurological:      General: No focal deficit present.      Mental Status: She is alert and oriented to person, place, and time. Mental status is at baseline.   Psychiatric:         Mood and Affect: Mood normal.         Behavior: Behavior normal.         Thought Content: Thought content normal.         Judgment: Judgment normal.         ASSESSMENT/PLAN     ASSESSMENT: Trochanteric bursitis of right hip  (primary encounter diagnosis)  Plan: triamcinolone acetonide (KENALOG-40) 40 MG/ML         injection 40 mg, DRAIN OR INJECT LARGE JOINT OR        BURSA WOUT ULTRASOUND GUIDE  Informed consent was given.  The skin was prepped with alcohol in the usual manner.  The patient was placed on her left side with her right trochanter up perpendicular to the table.  The pain was localized to the right greater trochanter.  40 mg of Kenalog in 3 cc 1% lidocaine without epinephrine were injected into the greater trochanteric bursa without complications.  The patient received immediate pain relief.  Post procedure instructions were discussed and  acknowledged by the patient.  Follow-up p.r.n..

## 2024-02-16 NOTE — DISCHARGE INSTR - COC
Respiratory Treatments: ***  Oxygen Therapy:  {Therapy; copd oxygen:86620}  Ventilator:    {[facility] Vent List:885795518}    Rehab Therapies: {THERAPEUTIC INTERVENTION:7114228426}  Weight Bearing Status/Restrictions: {[facility] Weight Bearin}  Other Medical Equipment (for information only, NOT a DME order):  {EQUIPMENT:577723852}  Other Treatments: ***    Patient's personal belongings (please select all that are sent with patient):  {P DME Belongings:739062226}    RN SIGNATURE:  {Esignature:433497239}    CASE MANAGEMENT/SOCIAL WORK SECTION    Inpatient Status Date: ***    Readmission Risk Assessment Score:  Readmission Risk              Risk of Unplanned Readmission:  0           Discharging to Facility/ Agency   Name:   Address:  Phone:  Fax:    Dialysis Facility (if applicable)   Name:  Address:  Dialysis Schedule:  Phone:  Fax:    / signature: {Esignature:091554687}    PHYSICIAN SECTION    Prognosis: {Prognosis:9726531152}    Condition at Discharge: { Patient Condition:341566926}    Rehab Potential (if transferring to Rehab): {Prognosis:6781490017}    Recommended Labs or Other Treatments After Discharge: ***    Physician Certification: I certify the above information and transfer of Nancy Bruno  is necessary for the continuing treatment of the diagnosis listed and that she requires {Admit to Appropriate Level of Care:47224} for {GREATER/LESS:829672154} 30 days.     Update Admission H&P: {CHP DME Changes in HandP:723617161}    PHYSICIAN SIGNATURE:  {Esignature:352251715}

## 2024-02-18 LAB
BACTERIA UR CULT: ABNORMAL
BACTERIA UR CULT: ABNORMAL
ORGANISM: ABNORMAL
ORGANISM: ABNORMAL

## 2024-02-19 ENCOUNTER — OFFICE VISIT (OUTPATIENT)
Dept: FAMILY MEDICINE CLINIC | Age: 34
End: 2024-02-19
Payer: COMMERCIAL

## 2024-02-19 VITALS
DIASTOLIC BLOOD PRESSURE: 74 MMHG | BODY MASS INDEX: 24.2 KG/M2 | OXYGEN SATURATION: 97 % | RESPIRATION RATE: 12 BRPM | SYSTOLIC BLOOD PRESSURE: 116 MMHG | HEART RATE: 74 BPM | TEMPERATURE: 97.2 F | WEIGHT: 128.2 LBS | HEIGHT: 61 IN

## 2024-02-19 DIAGNOSIS — F41.1 GENERALIZED ANXIETY DISORDER WITH PANIC ATTACKS: ICD-10-CM

## 2024-02-19 DIAGNOSIS — F41.0 GENERALIZED ANXIETY DISORDER WITH PANIC ATTACKS: ICD-10-CM

## 2024-02-19 DIAGNOSIS — F10.10 ALCOHOL ABUSE: ICD-10-CM

## 2024-02-19 DIAGNOSIS — N39.0 ACUTE URINARY TRACT INFECTION: ICD-10-CM

## 2024-02-19 DIAGNOSIS — F33.41 RECURRENT MAJOR DEPRESSIVE DISORDER, IN PARTIAL REMISSION (HCC): Primary | ICD-10-CM

## 2024-02-19 DIAGNOSIS — F10.930 ALCOHOL WITHDRAWAL SYNDROME WITHOUT COMPLICATION (HCC): ICD-10-CM

## 2024-02-19 PROBLEM — F33.2 SEVERE EPISODE OF RECURRENT MAJOR DEPRESSIVE DISORDER, WITHOUT PSYCHOTIC FEATURES (HCC): Status: RESOLVED | Noted: 2024-02-19 | Resolved: 2024-02-19

## 2024-02-19 PROBLEM — F33.2 SEVERE EPISODE OF RECURRENT MAJOR DEPRESSIVE DISORDER, WITHOUT PSYCHOTIC FEATURES (HCC): Status: ACTIVE | Noted: 2024-02-19

## 2024-02-19 PROCEDURE — G8427 DOCREV CUR MEDS BY ELIG CLIN: HCPCS | Performed by: NURSE PRACTITIONER

## 2024-02-19 PROCEDURE — G8484 FLU IMMUNIZE NO ADMIN: HCPCS | Performed by: NURSE PRACTITIONER

## 2024-02-19 PROCEDURE — 99214 OFFICE O/P EST MOD 30 MIN: CPT | Performed by: NURSE PRACTITIONER

## 2024-02-19 PROCEDURE — 1111F DSCHRG MED/CURRENT MED MERGE: CPT | Performed by: NURSE PRACTITIONER

## 2024-02-19 PROCEDURE — 4004F PT TOBACCO SCREEN RCVD TLK: CPT | Performed by: NURSE PRACTITIONER

## 2024-02-19 PROCEDURE — G8420 CALC BMI NORM PARAMETERS: HCPCS | Performed by: NURSE PRACTITIONER

## 2024-02-19 RX ORDER — HYDROXYZINE 50 MG/1
50 TABLET, FILM COATED ORAL EVERY 6 HOURS PRN
Qty: 60 TABLET | Refills: 0 | Status: SHIPPED | OUTPATIENT
Start: 2024-02-19

## 2024-02-19 NOTE — PROGRESS NOTES
n/    AAA Screening - n/a    Falls screening - n/a    Current Outpatient Medications   Medication Sig Dispense Refill    hydrOXYzine HCl (ATARAX) 50 MG tablet Take 1 tablet by mouth every 6 hours as needed for Anxiety 60 tablet 0    phenazopyridine (PYRIDIUM) 100 MG tablet Take 1 tablet by mouth 3 times daily as needed for Pain 9 tablet 0    ciprofloxacin (CIPRO) 500 MG tablet Take 1 tablet by mouth 2 times daily for 7 days 14 tablet 0    VIVITROL 380 MG injection 380 mg once      escitalopram (LEXAPRO) 20 MG tablet take 1 tablet by mouth once daily 30 tablet 1    glycopyrrolate (ROBINUL) 1 MG tablet take 1 tablet by mouth twice a day 180 tablet 1    Multiple Vitamin (MULTIVITAMIN) TABS tablet Take 1 tablet by mouth daily  0    acetaminophen (TYLENOL) 325 MG tablet Take 2 tablets by mouth every 6 hours as needed for Pain      omeprazole (PRILOSEC) 20 MG delayed release capsule take 1 capsule by mouth twice a day before meals 60 capsule 2    traZODone (DESYREL) 50 MG tablet take 1/2 to 1 tablet by mouth nightly if needed for sleep 90 tablet 1    ibuprofen (ADVIL;MOTRIN) 200 MG CAPS capsule Take 1 capsule by mouth every 6 hours as needed for Fever       No current facility-administered medications for this visit.     Orders Placed This Encounter   Medications    hydrOXYzine HCl (ATARAX) 50 MG tablet     Sig: Take 1 tablet by mouth every 6 hours as needed for Anxiety     Dispense:  60 tablet     Refill:  0         All medications reviewed and reconciled, including OTC and herbal medications. Updated list given to patient.       Patient Active Problem List   Diagnosis    Declines  (vaginal birth after ) trial    Moderate episode of recurrent major depressive disorder (HCC)    Generalized anxiety disorder with panic attacks    Gastroesophageal reflux disease    Generalized hyperhidrosis    Chest pain in adult    Alcoholism (HCC)    Acute alcoholic gastritis without hemorrhage    Severe episode of recurrent

## 2024-02-29 DIAGNOSIS — F33.1 MODERATE EPISODE OF RECURRENT MAJOR DEPRESSIVE DISORDER (HCC): ICD-10-CM

## 2024-02-29 DIAGNOSIS — F41.1 GENERALIZED ANXIETY DISORDER WITH PANIC ATTACKS: ICD-10-CM

## 2024-02-29 DIAGNOSIS — F41.0 GENERALIZED ANXIETY DISORDER WITH PANIC ATTACKS: ICD-10-CM

## 2024-02-29 RX ORDER — TRAZODONE HYDROCHLORIDE 50 MG/1
TABLET ORAL
Qty: 90 TABLET | Refills: 1 | OUTPATIENT
Start: 2024-02-29

## 2024-03-21 DIAGNOSIS — F41.1 GENERALIZED ANXIETY DISORDER WITH PANIC ATTACKS: ICD-10-CM

## 2024-03-21 DIAGNOSIS — F10.930 ALCOHOL WITHDRAWAL SYNDROME WITHOUT COMPLICATION (HCC): ICD-10-CM

## 2024-03-21 DIAGNOSIS — F41.0 GENERALIZED ANXIETY DISORDER WITH PANIC ATTACKS: ICD-10-CM

## 2024-03-21 RX ORDER — HYDROXYZINE 50 MG/1
50 TABLET, FILM COATED ORAL EVERY 6 HOURS PRN
Qty: 60 TABLET | Refills: 0 | OUTPATIENT
Start: 2024-03-21

## 2024-03-21 RX ORDER — HYDROXYZINE 50 MG/1
TABLET, FILM COATED ORAL
Qty: 60 TABLET | Refills: 0 | OUTPATIENT
Start: 2024-03-21

## 2024-04-08 DIAGNOSIS — F41.0 GENERALIZED ANXIETY DISORDER WITH PANIC ATTACKS: ICD-10-CM

## 2024-04-08 DIAGNOSIS — F41.1 GENERALIZED ANXIETY DISORDER WITH PANIC ATTACKS: ICD-10-CM

## 2024-04-08 RX ORDER — ESCITALOPRAM OXALATE 20 MG/1
20 TABLET ORAL DAILY
Qty: 30 TABLET | Refills: 1 | OUTPATIENT
Start: 2024-04-08

## 2024-04-09 ENCOUNTER — OFFICE VISIT (OUTPATIENT)
Dept: CARDIOLOGY CLINIC | Age: 34
End: 2024-04-09
Payer: COMMERCIAL

## 2024-04-09 VITALS
HEIGHT: 61 IN | HEART RATE: 85 BPM | BODY MASS INDEX: 24.21 KG/M2 | DIASTOLIC BLOOD PRESSURE: 67 MMHG | SYSTOLIC BLOOD PRESSURE: 103 MMHG | WEIGHT: 128.25 LBS

## 2024-04-09 DIAGNOSIS — R00.2 HEART PALPITATIONS: ICD-10-CM

## 2024-04-09 DIAGNOSIS — Z87.898 HISTORY OF SYNCOPE: ICD-10-CM

## 2024-04-09 DIAGNOSIS — R42 ORTHOSTATIC DIZZINESS: Primary | ICD-10-CM

## 2024-04-09 DIAGNOSIS — R07.89 CHEST PAIN, ATYPICAL: ICD-10-CM

## 2024-04-09 PROBLEM — R07.9 CHEST PAIN IN ADULT: Status: RESOLVED | Noted: 2024-01-24 | Resolved: 2024-04-09

## 2024-04-09 PROCEDURE — 99204 OFFICE O/P NEW MOD 45 MIN: CPT | Performed by: INTERNAL MEDICINE

## 2024-04-09 PROCEDURE — 4004F PT TOBACCO SCREEN RCVD TLK: CPT | Performed by: INTERNAL MEDICINE

## 2024-04-09 PROCEDURE — G8427 DOCREV CUR MEDS BY ELIG CLIN: HCPCS | Performed by: INTERNAL MEDICINE

## 2024-04-09 PROCEDURE — 93000 ELECTROCARDIOGRAM COMPLETE: CPT | Performed by: INTERNAL MEDICINE

## 2024-04-09 PROCEDURE — G8420 CALC BMI NORM PARAMETERS: HCPCS | Performed by: INTERNAL MEDICINE

## 2024-04-09 NOTE — PROGRESS NOTES
AM     02/15/2024 03:00 PM    CO2 22 02/15/2024 03:00 PM    BUN 10 02/15/2024 03:00 PM    LABALBU 3.8 02/15/2024 03:00 PM    CREATININE 0.6 02/15/2024 03:00 PM    CALCIUM 8.3 02/15/2024 03:00 PM    LABGLOM >60 02/15/2024 03:00 PM    GLUCOSE 77 02/15/2024 03:00 PM     Hepatic Function Panel:    Lab Results   Component Value Date/Time    ALKPHOS 53 02/15/2024 03:00 PM    ALT 20 02/15/2024 03:00 PM    AST 30 02/15/2024 03:00 PM    PROT 6.9 02/15/2024 03:00 PM    BILITOT 0.3 02/15/2024 03:00 PM    BILIDIR <0.2 01/24/2024 01:00 PM    LABALBU 3.8 02/15/2024 03:00 PM     Magnesium:    Lab Results   Component Value Date/Time    MG 1.8 01/27/2024 07:30 AM     Warfarin PT/INR:  No components found for: \"PTPATWAR\", \"PTINRWAR\"  HgBA1c:    Lab Results   Component Value Date/Time    LABA1C 4.9 01/25/2024 06:08 AM     FLP:    Lab Results   Component Value Date/Time    TRIG 123 01/25/2024 06:08 AM    HDL 36 01/25/2024 06:08 AM    LDLCALC 47 01/25/2024 06:08 AM     TSH:    Lab Results   Component Value Date/Time    TSH 1.290 01/24/2024 02:11 PM       Echo  jan 2024      Left Ventricle: Normal left ventricular systolic function with a visually estimated EF of 60 - 65%. Left ventricle size is normal. Normal wall thickness. Normal wall motion. Normal diastolic function.    Image quality is adequate.         EKG 4/9/24    Sinus Rhythm   -Right axis -may be normal for age.    PROBABLY NORMAL FOR AGE      Assessment   Diagnosis Orders   1. Orthostatic dizziness        2. Heart palpitations  EKG 12 lead      3. Chest pain, atypical        4. History of syncope              Plan     Meds and lavb reviewe  Continue the current treatment and with constant vigilance to changes in symptoms and also any potential side effects.  Return for care or seek medical attention immediately if symptoms got worse and/or develop new symptoms.    Orthost dizziness  Palpitations  Echo  TTT   2 weeks [id number] melendez exer EKG stress    Hydration advised min

## 2024-05-17 ENCOUNTER — OFFICE VISIT (OUTPATIENT)
Dept: CARDIOLOGY CLINIC | Age: 34
End: 2024-05-17
Payer: COMMERCIAL

## 2024-05-17 VITALS
HEIGHT: 61 IN | WEIGHT: 128.1 LBS | DIASTOLIC BLOOD PRESSURE: 70 MMHG | SYSTOLIC BLOOD PRESSURE: 116 MMHG | BODY MASS INDEX: 24.19 KG/M2 | HEART RATE: 102 BPM

## 2024-05-17 DIAGNOSIS — R00.2 HEART PALPITATIONS: ICD-10-CM

## 2024-05-17 DIAGNOSIS — Z87.898 HISTORY OF SYNCOPE: ICD-10-CM

## 2024-05-17 DIAGNOSIS — R42 ORTHOSTATIC DIZZINESS: Primary | ICD-10-CM

## 2024-05-17 DIAGNOSIS — R07.89 CHEST PAIN, ATYPICAL: ICD-10-CM

## 2024-05-17 PROCEDURE — 99213 OFFICE O/P EST LOW 20 MIN: CPT | Performed by: INTERNAL MEDICINE

## 2024-05-17 PROCEDURE — G8420 CALC BMI NORM PARAMETERS: HCPCS | Performed by: INTERNAL MEDICINE

## 2024-05-17 PROCEDURE — 4004F PT TOBACCO SCREEN RCVD TLK: CPT | Performed by: INTERNAL MEDICINE

## 2024-05-17 PROCEDURE — G8427 DOCREV CUR MEDS BY ELIG CLIN: HCPCS | Performed by: INTERNAL MEDICINE

## 2024-05-17 RX ORDER — ACAMPROSATE CALCIUM 333 MG/1
333 TABLET, DELAYED RELEASE ORAL 3 TIMES DAILY
COMMUNITY
Start: 2024-05-09

## 2024-05-17 RX ORDER — RISPERIDONE 0.5 MG/1
0.5 TABLET ORAL NIGHTLY
COMMUNITY
Start: 2024-05-09

## 2024-05-17 NOTE — PROGRESS NOTES
Chief Complaint   Patient presents with    Follow-up    Originally 23  patient here for check up - orthostatic hypotension  Seen in the ER 2/15/2024 for hypotension and IVF given  and stopped BP med clonidine and sent home from ER  Had Gall bladder  2024 and started clonidine and later stopped .    Post discharged back to her baseline and feel better with dizziness    Had of chronic postural dizziness since elementary age 12    Hx of syncope in elementary, and last one 2 yrs back  It has warning symptom of dizziness        6 weeks Follow up of the test and dizziness.    Pat did not get any the test ordered 6 week back    Continue to have dizziness on standing daily, palpitation 2x/ week,     Stated can not take full breath otherwise no sob    EKG done 2024.    Patient no showed her testing appt on 24. And advised to get her test done     Cont to have Occasional sharp chest pain, 10 , couple yrs and 2x/ weeks , last 2 to 3 min, associated with dizziness and palpitatiobn  Not exertion induced    Denied sob, edema and no recent syncope      Smoke  ppd    FHX   None for CAD    Past Surgical History:   Procedure Laterality Date     SECTION      x3    CHOLECYSTECTOMY, LAPAROSCOPIC N/A 2024    Laparoscopic Cholecystectomy performed by Sobia Gibson MD at [facility] OR       No Known Allergies     Family History   Problem Relation Age of Onset    Heart Disease Father         Social History     Socioeconomic History    Marital status: Single     Spouse name: Not on file    Number of children: Not on file    Years of education: Not on file    Highest education level: Not on file   Occupational History    Not on file   Tobacco Use    Smoking status: Some Days     Current packs/day: 0.00     Average packs/day: 0.3 packs/day for 5.2 years (1.3 ttl pk-yrs)     Types: Cigarettes     Start date:      Last attempt to quit:      Years since quittin.3    Smokeless tobacco: Never

## 2024-06-14 ENCOUNTER — HOSPITAL ENCOUNTER (OUTPATIENT)
Age: 34
End: 2024-06-14
Attending: INTERNAL MEDICINE
Payer: COMMERCIAL

## 2024-06-14 DIAGNOSIS — Z87.898 HISTORY OF SYNCOPE: ICD-10-CM

## 2024-06-14 DIAGNOSIS — R07.89 CHEST PAIN, ATYPICAL: ICD-10-CM

## 2024-06-14 DIAGNOSIS — R42 ORTHOSTATIC DIZZINESS: ICD-10-CM

## 2024-06-14 DIAGNOSIS — R00.2 HEART PALPITATIONS: ICD-10-CM

## 2024-06-14 PROCEDURE — 93246 EXT ECG>7D<15D RECORDING: CPT

## 2024-07-02 ENCOUNTER — HOSPITAL ENCOUNTER (OUTPATIENT)
Age: 34
Discharge: HOME | End: 2024-07-04
Attending: INTERNAL MEDICINE
Payer: COMMERCIAL

## 2024-07-02 VITALS — BODY MASS INDEX: 23.98 KG/M2 | WEIGHT: 127 LBS | HEIGHT: 61 IN

## 2024-07-02 DIAGNOSIS — R07.89 CHEST PAIN, ATYPICAL: ICD-10-CM

## 2024-07-02 DIAGNOSIS — R07.9 CHEST PAIN, UNSPECIFIED TYPE: Primary | ICD-10-CM

## 2024-07-02 DIAGNOSIS — R00.2 HEART PALPITATIONS: ICD-10-CM

## 2024-07-02 DIAGNOSIS — Z87.898 HISTORY OF SYNCOPE: ICD-10-CM

## 2024-07-02 DIAGNOSIS — R42 ORTHOSTATIC DIZZINESS: ICD-10-CM

## 2024-07-02 LAB
ECHO BSA: 1.57 M2
ECHO BSA: 1.57 M2
STRESS ANGINA INDEX: 0
STRESS BASELINE DIAS BP: 60 MMHG
STRESS BASELINE HR: 61 BPM
STRESS BASELINE ST DEPRESSION: 0 MM
STRESS BASELINE SYS BP: 102 MMHG
STRESS ESTIMATED WORKLOAD: 12.8 METS
STRESS EXERCISE DUR MIN: 10 MIN
STRESS EXERCISE DUR SEC: 30 SEC
STRESS PEAK DIAS BP: 80 MMHG
STRESS PEAK SYS BP: 150 MMHG
STRESS PERCENT HR ACHIEVED: 86 %
STRESS POST PEAK HR: 160 BPM
STRESS RATE PRESSURE PRODUCT: NORMAL BPM*MMHG
STRESS SR DUKE TREADMILL SCORE: 11
STRESS ST DEPRESSION: 0 MM
STRESS STAGE 1 BP: NORMAL MMHG
STRESS STAGE 1 DURATION: 3 MIN:SEC
STRESS STAGE 1 HR: 88 BPM
STRESS STAGE 2 BP: NORMAL MMHG
STRESS STAGE 2 DURATION: 3 MIN:SEC
STRESS STAGE 2 HR: 103 BPM
STRESS STAGE 3 BP: NORMAL MMHG
STRESS STAGE 3 DURATION: 3 MIN:SEC
STRESS STAGE 3 HR: 123 BPM
STRESS STAGE 4 DURATION: NORMAL MIN:SEC
STRESS STAGE 4 HR: 158 BPM
STRESS STAGE RECOVERY 1 BP: NORMAL MMHG
STRESS STAGE RECOVERY 1 DURATION: 1 MIN:SEC
STRESS STAGE RECOVERY 1 HR: 160 BPM
STRESS STAGE RECOVERY 2 BP: NORMAL MMHG
STRESS STAGE RECOVERY 2 DURATION: 1 MIN:SEC
STRESS STAGE RECOVERY 2 HR: 113 BPM
STRESS STAGE RECOVERY 4 BP: NORMAL MMHG
STRESS STAGE RECOVERY 4 DURATION: 3 MIN:SEC
STRESS STAGE RECOVERY 4 HR: 67 BPM
STRESS TARGET HR: 186 BPM
TILT CV INITIAL SUPINE HEART RATE: 60 BPM
TILT CV INITIAL SUPINE MAX BP: NORMAL BPM
TILT CV MAX BP BLOOD PRESSURE: NORMAL MMHG
TILT CV MAX BP HEART RATE: 67 BPM
TILT CV MAX BP MINUTES: 17
TILT CV MAX HEART RATE: 82 BPM
TILT CV MAX HR BLOOD PRESSURE: NORMAL MMHG
TILT CV MAX HR MINUTES: 29
TILT CV MINIMUM BP BLOOD PRESSURE: NORMAL MMHG
TILT CV MINIMUM BP HEART RATE: 68 BPM
TILT CV MINIMUM BP MINUTES: 30
TILT CV MINIMUM HR BP: NORMAL MMHG
TILT CV MINIMUM HR HEART RATE: 64 BPM
TILT CV MINIMUM HR MINUTES: 14

## 2024-07-02 PROCEDURE — 93660 TILT TABLE EVALUATION: CPT | Performed by: INTERNAL MEDICINE

## 2024-07-02 PROCEDURE — 93660 TILT TABLE EVALUATION: CPT

## 2024-07-02 PROCEDURE — 93017 CV STRESS TEST TRACING ONLY: CPT

## 2024-07-02 PROCEDURE — 2580000003 HC RX 258: Performed by: INTERNAL MEDICINE

## 2024-07-02 RX ORDER — SODIUM CHLORIDE 9 MG/ML
INJECTION, SOLUTION INTRAVENOUS CONTINUOUS
Status: DISCONTINUED | OUTPATIENT
Start: 2024-07-02 | End: 2024-07-05 | Stop reason: HOSPADM

## 2024-07-02 RX ADMIN — SODIUM CHLORIDE: 9 INJECTION, SOLUTION INTRAVENOUS at 08:17

## 2024-07-17 ENCOUNTER — OFFICE VISIT (OUTPATIENT)
Dept: CARDIOLOGY CLINIC | Age: 34
End: 2024-07-17
Payer: COMMERCIAL

## 2024-07-17 VITALS
BODY MASS INDEX: 24.64 KG/M2 | DIASTOLIC BLOOD PRESSURE: 62 MMHG | HEIGHT: 61 IN | WEIGHT: 130.5 LBS | SYSTOLIC BLOOD PRESSURE: 93 MMHG | HEART RATE: 73 BPM

## 2024-07-17 DIAGNOSIS — R07.89 CHEST PAIN, ATYPICAL: ICD-10-CM

## 2024-07-17 DIAGNOSIS — R42 DIZZINESS, NONSPECIFIC: ICD-10-CM

## 2024-07-17 DIAGNOSIS — R00.2 HEART PALPITATIONS: Primary | ICD-10-CM

## 2024-07-17 DIAGNOSIS — R42 ORTHOSTATIC DIZZINESS: ICD-10-CM

## 2024-07-17 PROCEDURE — G8427 DOCREV CUR MEDS BY ELIG CLIN: HCPCS | Performed by: INTERNAL MEDICINE

## 2024-07-17 PROCEDURE — G8420 CALC BMI NORM PARAMETERS: HCPCS | Performed by: INTERNAL MEDICINE

## 2024-07-17 PROCEDURE — 99214 OFFICE O/P EST MOD 30 MIN: CPT | Performed by: INTERNAL MEDICINE

## 2024-07-17 PROCEDURE — 4004F PT TOBACCO SCREEN RCVD TLK: CPT | Performed by: INTERNAL MEDICINE

## 2024-07-17 NOTE — PROGRESS NOTES
capsule by mouth every 6 hours as needed for Fever       No current facility-administered medications for this visit.       Review of Systems -     General ROS: negative  Psychological ROS: negative  Hematological and Lymphatic ROS: No history of blood clots or bleeding disorder.   Respiratory ROS: no cough,  or wheezing, the rest see HPI  Cardiovascular ROS: See HPI  Gastrointestinal ROS: negative  Genito-Urinary ROS: no dysuria, trouble voiding, or hematuria  Musculoskeletal ROS: negative  Neurological ROS: no TIA or stroke symptoms  Dermatological ROS: negative      Blood pressure 93/62, pulse 73, height 1.549 m (5' 1\"), weight 59.2 kg (130 lb 8 oz), not currently breastfeeding.        Physical Examination:    General appearance - alert, well appearing, and in no distress  HEENT- Pink conjunctiva  , Non-icteri sclera,PERRLA  Mental status - alert, oriented to person, place, and time  Neck - supple, no significant adenopathy, no JVD, or carotid bruits  Chest - clear to auscultation, no wheezes, rales or rhonchi, symmetric air entry  Heart - normal rate, regular rhythm, normal S1, S2, no murmurs, rubs, clicks or gallops  Abdomen - soft, nontender, nondistended, no masses or organomegaly  LIBERTY- no CVA or flank tenderness, no suprapubic tenderness  Neurological - alert, oriented, normal speech, no focal findings or movement disorder noted  Musculoskeletal/limbs - no joint tenderness, deformity or swelling   - peripheral pulses normal, no pedal edema, no clubbing or cyanosis  Skin - normal coloration and turgor, no rashes, no suspicious skin lesions noted  Psych- appropriate mood and affect    Lab  No results for input(s): \"CKTOTAL\", \"CKMB\", \"CKMBINDEX\", \"TROPONINI\" in the last 72 hours.  CBC:   Lab Results   Component Value Date/Time    WBC 4.0 02/15/2024 03:00 PM    RBC 3.55 02/15/2024 03:00 PM    RBC 3.86 04/21/2016 09:45 AM    HGB 10.4 02/15/2024 03:00 PM    HCT 33.5 02/15/2024 03:00 PM    MCV 94.4 02/15/2024 03:00

## 2024-07-17 NOTE — PATIENT INSTRUCTIONS
Your  Nurses  Today:  Mendy    Your Provider for Today: Dr. Fuentes        You may receive a survey regarding the care you received during your visit.  Your input is valuable to us.  We encourage you to complete and return your survey.  We hope you will choose us in the future for your healthcare needs.

## 2024-08-04 ENCOUNTER — HOSPITAL ENCOUNTER (EMERGENCY)
Age: 34
Discharge: ANOTHER HOSPITAL | End: 2024-08-04
Payer: COMMERCIAL

## 2024-08-04 ENCOUNTER — APPOINTMENT (OUTPATIENT)
Dept: CT IMAGING | Age: 34
End: 2024-08-04
Payer: COMMERCIAL

## 2024-08-04 VITALS
TEMPERATURE: 97.9 F | OXYGEN SATURATION: 96 % | HEART RATE: 99 BPM | BODY MASS INDEX: 24.17 KG/M2 | WEIGHT: 128 LBS | DIASTOLIC BLOOD PRESSURE: 86 MMHG | RESPIRATION RATE: 20 BRPM | SYSTOLIC BLOOD PRESSURE: 132 MMHG | HEIGHT: 61 IN

## 2024-08-04 DIAGNOSIS — R07.89 ATYPICAL CHEST PAIN: ICD-10-CM

## 2024-08-04 DIAGNOSIS — S01.85XA DOG BITE OF FACE, INITIAL ENCOUNTER: Primary | ICD-10-CM
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
## 2024-08-04 DIAGNOSIS — F17.210 CIGARETTE SMOKER: ICD-10-CM

## 2024-08-04 DIAGNOSIS — S01.81XA FACIAL LACERATION, INITIAL ENCOUNTER: ICD-10-CM

## 2024-08-04 LAB
ANION GAP SERPL CALC-SCNC: 12 MEQ/L (ref 8–16)
BASOPHILS ABSOLUTE: 0 THOU/MM3 (ref 0–0.1)
BASOPHILS NFR BLD AUTO: 0.4 %
BUN SERPL-MCNC: 10 MG/DL (ref 7–22)
CALCIUM SERPL-MCNC: 8.7 MG/DL (ref 8.5–10.5)
CHLORIDE SERPL-SCNC: 106 MEQ/L (ref 98–111)
CO2 SERPL-SCNC: 23 MEQ/L (ref 23–33)
CREAT SERPL-MCNC: 0.6 MG/DL (ref 0.4–1.2)
DEPRECATED RDW RBC AUTO: 45.8 FL (ref 35–45)
EKG ATRIAL RATE: 68 BPM
EKG P AXIS: 48 DEGREES
EKG P-R INTERVAL: 134 MS
EKG Q-T INTERVAL: 412 MS
EKG QRS DURATION: 78 MS
EKG QTC CALCULATION (BAZETT): 438 MS
EKG R AXIS: 71 DEGREES
EKG T AXIS: 44 DEGREES
EKG VENTRICULAR RATE: 68 BPM
EOSINOPHIL NFR BLD AUTO: 0.1 %
EOSINOPHILS ABSOLUTE: 0 THOU/MM3 (ref 0–0.4)
ERYTHROCYTE [DISTWIDTH] IN BLOOD BY AUTOMATED COUNT: 14 % (ref 11.5–14.5)
GFR SERPL CREATININE-BSD FRML MDRD: > 90 ML/MIN/1.73M2
GLUCOSE SERPL-MCNC: 86 MG/DL (ref 70–108)
HCT VFR BLD AUTO: 36.7 % (ref 37–47)
HGB BLD-MCNC: 11.9 GM/DL (ref 12–16)
IMM GRANULOCYTES # BLD AUTO: 0.03 THOU/MM3 (ref 0–0.07)
IMM GRANULOCYTES NFR BLD AUTO: 0.4 %
LYMPHOCYTES ABSOLUTE: 1.5 THOU/MM3 (ref 1–4.8)
LYMPHOCYTES NFR BLD AUTO: 21.4 %
MAGNESIUM SERPL-MCNC: 2 MG/DL (ref 1.6–2.4)
MCH RBC QN AUTO: 29.1 PG (ref 26–33)
MCHC RBC AUTO-ENTMCNC: 32.4 GM/DL (ref 32.2–35.5)
MCV RBC AUTO: 89.7 FL (ref 81–99)
MONOCYTES ABSOLUTE: 0.3 THOU/MM3 (ref 0.4–1.3)
MONOCYTES NFR BLD AUTO: 4.8 %
NEUTROPHILS ABSOLUTE: 5 THOU/MM3 (ref 1.8–7.7)
NEUTROPHILS NFR BLD AUTO: 72.9 %
NRBC BLD AUTO-RTO: 0 /100 WBC
PLATELET # BLD AUTO: 178 THOU/MM3 (ref 130–400)
PMV BLD AUTO: 10.1 FL (ref 9.4–12.4)
POTASSIUM SERPL-SCNC: 3.9 MEQ/L (ref 3.5–5.2)
RBC # BLD AUTO: 4.09 MILL/MM3 (ref 4.2–5.4)
SODIUM SERPL-SCNC: 141 MEQ/L (ref 135–145)
TROPONIN, HIGH SENSITIVITY: < 6 NG/L (ref 0–12)
WBC # BLD AUTO: 6.9 THOU/MM3 (ref 4.8–10.8)

## 2024-08-04 PROCEDURE — 6360000002 HC RX W HCPCS: Performed by: PHYSICIAN ASSISTANT

## 2024-08-04 PROCEDURE — 80048 BASIC METABOLIC PNL TOTAL CA: CPT

## 2024-08-04 PROCEDURE — 6370000000 HC RX 637 (ALT 250 FOR IP): Performed by: PHYSICIAN ASSISTANT

## 2024-08-04 PROCEDURE — 90471 IMMUNIZATION ADMIN: CPT | Performed by: PHYSICIAN ASSISTANT

## 2024-08-04 PROCEDURE — 96375 TX/PRO/DX INJ NEW DRUG ADDON: CPT

## 2024-08-04 PROCEDURE — 85025 COMPLETE CBC W/AUTO DIFF WBC: CPT

## 2024-08-04 PROCEDURE — 96366 THER/PROPH/DIAG IV INF ADDON: CPT

## 2024-08-04 PROCEDURE — 93005 ELECTROCARDIOGRAM TRACING: CPT | Performed by: PHYSICIAN ASSISTANT

## 2024-08-04 PROCEDURE — 2500000003 HC RX 250 WO HCPCS: Performed by: PHYSICIAN ASSISTANT

## 2024-08-04 PROCEDURE — 70486 CT MAXILLOFACIAL W/O DYE: CPT

## 2024-08-04 PROCEDURE — 99285 EMERGENCY DEPT VISIT HI MDM: CPT

## 2024-08-04 PROCEDURE — 93010 ELECTROCARDIOGRAM REPORT: CPT | Performed by: INTERNAL MEDICINE

## 2024-08-04 PROCEDURE — 70450 CT HEAD/BRAIN W/O DYE: CPT

## 2024-08-04 PROCEDURE — 84484 ASSAY OF TROPONIN QUANT: CPT

## 2024-08-04 PROCEDURE — 96376 TX/PRO/DX INJ SAME DRUG ADON: CPT

## 2024-08-04 PROCEDURE — 90715 TDAP VACCINE 7 YRS/> IM: CPT | Performed by: PHYSICIAN ASSISTANT

## 2024-08-04 PROCEDURE — 83735 ASSAY OF MAGNESIUM: CPT

## 2024-08-04 PROCEDURE — 2580000003 HC RX 258: Performed by: PHYSICIAN ASSISTANT

## 2024-08-04 PROCEDURE — 12054 INTMD RPR FACE/MM 7.6-12.5CM: CPT

## 2024-08-04 PROCEDURE — 96365 THER/PROPH/DIAG IV INF INIT: CPT

## 2024-08-04 PROCEDURE — 36415 COLL VENOUS BLD VENIPUNCTURE: CPT

## 2024-08-04 RX ORDER — MORPHINE SULFATE 4 MG/ML
4 INJECTION, SOLUTION INTRAMUSCULAR; INTRAVENOUS ONCE
Status: COMPLETED | OUTPATIENT
Start: 2024-08-04 | End: 2024-08-04

## 2024-08-04 RX ORDER — ERYTHROMYCIN 5 MG/G
OINTMENT OPHTHALMIC ONCE
Status: COMPLETED | OUTPATIENT
Start: 2024-08-04 | End: 2024-08-04

## 2024-08-04 RX ORDER — PROPARACAINE HYDROCHLORIDE 5 MG/ML
1 SOLUTION/ DROPS OPHTHALMIC ONCE
Status: COMPLETED | OUTPATIENT
Start: 2024-08-04 | End: 2024-08-04

## 2024-08-04 RX ORDER — GINSENG 100 MG
CAPSULE ORAL ONCE
Status: DISCONTINUED | OUTPATIENT
Start: 2024-08-04 | End: 2024-08-04

## 2024-08-04 RX ORDER — LIDOCAINE HYDROCHLORIDE AND EPINEPHRINE 10; 10 MG/ML; UG/ML
20 INJECTION, SOLUTION INFILTRATION; PERINEURAL ONCE
Status: COMPLETED | OUTPATIENT
Start: 2024-08-04 | End: 2024-08-04

## 2024-08-04 RX ORDER — ONDANSETRON 2 MG/ML
4 INJECTION INTRAMUSCULAR; INTRAVENOUS ONCE
Status: COMPLETED | OUTPATIENT
Start: 2024-08-04 | End: 2024-08-04

## 2024-08-04 RX ORDER — GINSENG 100 MG
CAPSULE ORAL ONCE
Status: COMPLETED | OUTPATIENT
Start: 2024-08-04 | End: 2024-08-04

## 2024-08-04 RX ADMIN — LIDOCAINE HYDROCHLORIDE AND EPINEPHRINE 20 ML: 10; 10 INJECTION, SOLUTION INFILTRATION; PERINEURAL at 11:06

## 2024-08-04 RX ADMIN — HYDROMORPHONE HYDROCHLORIDE 1 MG: 1 INJECTION, SOLUTION INTRAMUSCULAR; INTRAVENOUS; SUBCUTANEOUS at 14:16

## 2024-08-04 RX ADMIN — ONDANSETRON 4 MG: 2 INJECTION INTRAMUSCULAR; INTRAVENOUS at 10:31

## 2024-08-04 RX ADMIN — Medication 3 ML: at 10:45

## 2024-08-04 RX ADMIN — MORPHINE SULFATE 4 MG: 4 INJECTION, SOLUTION INTRAMUSCULAR; INTRAVENOUS at 10:30

## 2024-08-04 RX ADMIN — TETANUS TOXOID, REDUCED DIPHTHERIA TOXOID AND ACELLULAR PERTUSSIS VACCINE, ADSORBED 0.5 ML: 5; 2.5; 8; 8; 2.5 SUSPENSION INTRAMUSCULAR at 10:44

## 2024-08-04 RX ADMIN — BACITRACIN: 500 OINTMENT TOPICAL at 13:09

## 2024-08-04 RX ADMIN — ERYTHROMYCIN: 5 OINTMENT OPHTHALMIC at 10:44

## 2024-08-04 RX ADMIN — MORPHINE SULFATE 4 MG: 4 INJECTION, SOLUTION INTRAMUSCULAR; INTRAVENOUS at 12:30

## 2024-08-04 RX ADMIN — SODIUM CHLORIDE 3000 MG: 900 INJECTION INTRAVENOUS at 12:37

## 2024-08-04 RX ADMIN — PROPARACAINE HYDROCHLORIDE 1 DROP: 5 SOLUTION/ DROPS OPHTHALMIC at 11:06

## 2024-08-04 RX ADMIN — FLUORESCEIN SODIUM 1 MG: 1 STRIP OPHTHALMIC at 11:06

## 2024-08-04 RX ADMIN — LIDOCAINE HYDROCHLORIDE: 20 SOLUTION ORAL at 13:08

## 2024-08-04 NOTE — ED PROVIDER NOTES
DISPOSITION Decision To Transfer 08/04/2024 01:12:32 PM      OUTPATIENT FOLLOW UP THE PATIENT:  No follow-up provider specified.    NILE Haro Robert A, PA  08/04/24 [id number]

## 2024-08-04 NOTE — ED NOTES
In to complete orders.  Patient offered anxiety medication, declines at this time. Patient states she will notify staff if she feels like she needs it in the future.  No further needs voiced.

## 2024-08-04 NOTE — ED TRIAGE NOTES
Patient to room 9 from intake for a facial laceration sustained from a bite from a [place] approximately 20 minutes ago.  Patient has an approximately 8 cm laceration to the right side of her forehead into her scalp.  It appears to be approximately 0.5 cm deep.  Bleeding is controlled. There is also an approximate 0.5 cm-1 cm laceration to her lower eyelid.  Patient has not had an updated tetanus vaccination.  Shelley CRABTREE and Dr. Morton to bedside.

## 2024-08-11 ENCOUNTER — HOSPITAL ENCOUNTER (EMERGENCY)
Age: 34
Discharge: HOME | End: 2024-08-11
Payer: COMMERCIAL

## 2024-08-11 VITALS
OXYGEN SATURATION: 99 % | SYSTOLIC BLOOD PRESSURE: 121 MMHG | DIASTOLIC BLOOD PRESSURE: 75 MMHG | HEIGHT: 61 IN | TEMPERATURE: 98.4 F | HEART RATE: 65 BPM | BODY MASS INDEX: 24.17 KG/M2 | WEIGHT: 128 LBS | RESPIRATION RATE: 16 BRPM

## 2024-08-11 DIAGNOSIS — Z48.02 VISIT FOR SUTURE REMOVAL: Primary | ICD-10-CM

## 2024-08-11 DIAGNOSIS — Z48.02 ENCOUNTER FOR STAPLE REMOVAL: ICD-10-CM

## 2024-08-11 PROCEDURE — 99282 EMERGENCY DEPT VISIT SF MDM: CPT

## 2024-08-11 ASSESSMENT — PAIN - FUNCTIONAL ASSESSMENT: PAIN_FUNCTIONAL_ASSESSMENT: NONE - DENIES PAIN

## 2024-08-11 NOTE — ED PROVIDER NOTES
Last Year: No     Number of Places Lived in the Last Year: 1     Unstable Housing in the Last Year: No       PHYSICAL EXAM     INITIAL VITALS:  height is 1.549 m (5' 1\") and weight is 58.1 kg (128 lb). Her oral temperature is 98.4 °F (36.9 °C). Her blood pressure is 121/75 and her pulse is 65. Her respiration is 16 and oxygen saturation is 99%.    Physical Exam  Constitutional:       Appearance: Normal appearance. She is normal weight.   HENT:      Head: Normocephalic.   Eyes:      General:         Right eye: No discharge.         Left eye: No discharge.      Extraocular Movements: Extraocular movements intact.      Conjunctiva/sclera: Conjunctivae normal.      Pupils: Pupils are equal, round, and reactive to light.      Comments: Noted periorbital bruising bilaterally   Cardiovascular:      Rate and Rhythm: Normal rate.      Pulses: Normal pulses.   Pulmonary:      Effort: Pulmonary effort is normal.   Skin:     General: Skin is warm.      Findings: Laceration present.      Comments: Laceration to forehead, well healing, well-approximated, no signs of infection.  7 sutures and 3 staples noted   Neurological:      Mental Status: She is alert.         DIFFERENTIAL DIAGNOSIS:   Suture removal, staple removal  DIAGNOSTIC RESULTS       RADIOLOGY: non-plainfilm images(s) such as CT, Ultrasound and MRI are read by the radiologist.  Plain radiographic images are visualized and preliminarily interpreted by the emergency physician unless otherwise stated below.  No orders to display       LABS:   Labs Reviewed - No data to display    EMERGENCY DEPARTMENT COURSE:   Vitals:    Vitals:    08/11/24 1330   BP: 121/75   Pulse: 65   Resp: 16   Temp: 98.4 °F (36.9 °C)   TempSrc: Oral   SpO2: 99%   Weight: 58.1 kg (128 lb)   Height: 1.549 m (5' 1\")         MDM    Patient was seen and evaluated in the emergency department, patient appeared to be in no acute distress, vital signs reviewed, no significant findings noted.  Physical exam

## 2025-01-15 ENCOUNTER — OFFICE VISIT (OUTPATIENT)
Dept: CARDIOLOGY CLINIC | Age: 35
End: 2025-01-15
Payer: COMMERCIAL

## 2025-01-15 VITALS
WEIGHT: 132.2 LBS | SYSTOLIC BLOOD PRESSURE: 108 MMHG | HEIGHT: 61 IN | HEART RATE: 75 BPM | BODY MASS INDEX: 24.96 KG/M2 | DIASTOLIC BLOOD PRESSURE: 72 MMHG

## 2025-01-15 DIAGNOSIS — R42 ORTHOSTATIC DIZZINESS: ICD-10-CM

## 2025-01-15 DIAGNOSIS — Z87.898 HISTORY OF SYNCOPE: ICD-10-CM

## 2025-01-15 DIAGNOSIS — R42 DIZZINESS, NONSPECIFIC: ICD-10-CM

## 2025-01-15 DIAGNOSIS — R00.2 HEART PALPITATIONS: Primary | ICD-10-CM

## 2025-01-15 DIAGNOSIS — R07.89 CHEST PAIN, ATYPICAL: ICD-10-CM

## 2025-01-15 PROCEDURE — 99214 OFFICE O/P EST MOD 30 MIN: CPT | Performed by: INTERNAL MEDICINE

## 2025-01-15 PROCEDURE — 4004F PT TOBACCO SCREEN RCVD TLK: CPT | Performed by: INTERNAL MEDICINE

## 2025-01-15 PROCEDURE — G8427 DOCREV CUR MEDS BY ELIG CLIN: HCPCS | Performed by: INTERNAL MEDICINE

## 2025-01-15 PROCEDURE — G8420 CALC BMI NORM PARAMETERS: HCPCS | Performed by: INTERNAL MEDICINE

## 2025-01-15 NOTE — PROGRESS NOTES
tablet 1    omeprazole (PRILOSEC) 20 MG delayed release capsule take 1 capsule by mouth twice a day before meals (Patient not taking: Reported on 1/15/2025) 60 capsule 2     No current facility-administered medications for this visit.       Review of Systems -     General ROS: negative  Psychological ROS: negative  Hematological and Lymphatic ROS: No history of blood clots or bleeding disorder.   Respiratory ROS: no cough,  or wheezing, the rest see HPI  Cardiovascular ROS: See HPI  Gastrointestinal ROS: negative  Genito-Urinary ROS: no dysuria, trouble voiding, or hematuria  Musculoskeletal ROS: negative  Neurological ROS: no TIA or stroke symptoms  Dermatological ROS: negative      Blood pressure 108/72, pulse 75, height 1.549 m (5' 1\"), weight 60 kg (132 lb 3.2 oz), not currently breastfeeding.        Physical Examination:    General appearance - alert, well appearing, and in no distress  HEENT- Pink conjunctiva  , Non-icteri sclera,PERRLA  Mental status - alert, oriented to person, place, and time  Neck - supple, no significant adenopathy, no JVD, or carotid bruits  Chest - clear to auscultation, no wheezes, rales or rhonchi, symmetric air entry  Heart - normal rate, regular rhythm, normal S1, S2, no murmurs, rubs, clicks or gallops  Abdomen - soft, nontender, nondistended, no masses or organomegaly  LIBERTY- no CVA or flank tenderness, no suprapubic tenderness  Neurological - alert, oriented, normal speech, no focal findings or movement disorder noted  Musculoskeletal/limbs - no joint tenderness, deformity or swelling   - peripheral pulses normal, no pedal edema, no clubbing or cyanosis  Skin - normal coloration and turgor, no rashes, no suspicious skin lesions noted  Psych- appropriate mood and affect    Lab  No results for input(s): \"CKTOTAL\", \"CKMB\", \"CKMBINDEX\", \"TROPONINI\" in the last 72 hours.  CBC:   Lab Results   Component Value Date/Time    WBC 6.9 08/04/2024 12:50 PM    RBC 4.09 08/04/2024 12:50 PM    RBC

## 2025-04-01 ENCOUNTER — APPOINTMENT (OUTPATIENT)
Age: 35
End: 2025-04-01
Attending: EMERGENCY MEDICINE
Payer: COMMERCIAL

## 2025-04-01 ENCOUNTER — APPOINTMENT (OUTPATIENT)
Dept: GENERAL RADIOLOGY | Age: 35
End: 2025-04-01
Payer: COMMERCIAL

## 2025-04-01 ENCOUNTER — HOSPITAL ENCOUNTER (EMERGENCY)
Age: 35
Discharge: HOME | End: 2025-04-01
Attending: EMERGENCY MEDICINE
Payer: COMMERCIAL

## 2025-04-01 VITALS
RESPIRATION RATE: 17 BRPM | TEMPERATURE: 97.9 F | SYSTOLIC BLOOD PRESSURE: 95 MMHG | HEART RATE: 69 BPM | DIASTOLIC BLOOD PRESSURE: 63 MMHG | OXYGEN SATURATION: 100 %

## 2025-04-01 DIAGNOSIS — F41.1 ANXIETY STATE: ICD-10-CM

## 2025-04-01 DIAGNOSIS — R07.89 ATYPICAL CHEST PAIN: Primary | ICD-10-CM

## 2025-04-01 LAB
ALBUMIN SERPL BCG-MCNC: 3.9 G/DL (ref 3.4–4.9)
ALP SERPL-CCNC: 91 U/L (ref 35–104)
ALT SERPL W/O P-5'-P-CCNC: 125 U/L (ref 10–35)
ANION GAP SERPL CALC-SCNC: 11 MEQ/L (ref 8–16)
AST SERPL-CCNC: 273 U/L (ref 10–35)
B-HCG SERPL QL: NEGATIVE
BASOPHILS ABSOLUTE: 0 THOU/MM3 (ref 0–0.1)
BASOPHILS NFR BLD AUTO: 0.2 %
BILIRUB CONJ SERPL-MCNC: < 0.1 MG/DL (ref 0–0.2)
BILIRUB SERPL-MCNC: 0.3 MG/DL (ref 0.3–1.2)
BUN SERPL-MCNC: 13 MG/DL (ref 8–23)
CALCIUM SERPL-MCNC: 8.8 MG/DL (ref 8.6–10)
CHLORIDE SERPL-SCNC: 105 MEQ/L (ref 98–111)
CO2 SERPL-SCNC: 22 MEQ/L (ref 22–29)
CREAT SERPL-MCNC: 0.5 MG/DL (ref 0.5–0.9)
DEPRECATED RDW RBC AUTO: 43.1 FL (ref 35–45)
EKG ATRIAL RATE: 69 BPM
EKG P AXIS: 53 DEGREES
EKG P-R INTERVAL: 138 MS
EKG Q-T INTERVAL: 398 MS
EKG QRS DURATION: 80 MS
EKG QTC CALCULATION (BAZETT): 426 MS
EKG R AXIS: 79 DEGREES
EKG T AXIS: 50 DEGREES
EKG VENTRICULAR RATE: 69 BPM
EOSINOPHIL NFR BLD AUTO: 0.6 %
EOSINOPHILS ABSOLUTE: 0 THOU/MM3 (ref 0–0.4)
ERYTHROCYTE [DISTWIDTH] IN BLOOD BY AUTOMATED COUNT: 13.2 % (ref 11.5–14.5)
GFR SERPL CREATININE-BSD FRML MDRD: > 90 ML/MIN/1.73M2
GLUCOSE SERPL-MCNC: 97 MG/DL (ref 74–109)
HCT VFR BLD AUTO: 38.3 % (ref 37–47)
HGB BLD-MCNC: 12.8 GM/DL (ref 12–16)
IMM GRANULOCYTES # BLD AUTO: 0.02 THOU/MM3 (ref 0–0.07)
IMM GRANULOCYTES NFR BLD AUTO: 0.4 %
LIPASE SERPL-CCNC: 16 U/L (ref 13–60)
LYMPHOCYTES ABSOLUTE: 2 THOU/MM3 (ref 1–4.8)
LYMPHOCYTES NFR BLD AUTO: 39 %
MCH RBC QN AUTO: 29.8 PG (ref 26–33)
MCHC RBC AUTO-ENTMCNC: 33.4 GM/DL (ref 32.2–35.5)
MCV RBC AUTO: 89.3 FL (ref 81–99)
MONOCYTES ABSOLUTE: 0.3 THOU/MM3 (ref 0.4–1.3)
MONOCYTES NFR BLD AUTO: 6.6 %
NEUTROPHILS ABSOLUTE: 2.7 THOU/MM3 (ref 1.8–7.7)
NEUTROPHILS NFR BLD AUTO: 53.2 %
NRBC BLD AUTO-RTO: 0 /100 WBC
OSMOLALITY SERPL CALC.SUM OF ELEC: 275.7 MOSMOL/KG (ref 275–300)
PLATELET # BLD AUTO: 169 THOU/MM3 (ref 130–400)
PMV BLD AUTO: 10.3 FL (ref 9.4–12.4)
POTASSIUM SERPL-SCNC: 3.8 MEQ/L (ref 3.5–5.2)
PROT SERPL-MCNC: 6.8 G/DL (ref 6.4–8.3)
RBC # BLD AUTO: 4.29 MILL/MM3 (ref 4.2–5.4)
SODIUM SERPL-SCNC: 138 MEQ/L (ref 135–145)
TROPONIN, HIGH SENSITIVITY: < 6 NG/L (ref 0–12)
WBC # BLD AUTO: 5 THOU/MM3 (ref 4.8–10.8)

## 2025-04-01 PROCEDURE — 96372 THER/PROPH/DIAG INJ SC/IM: CPT

## 2025-04-01 PROCEDURE — 85025 COMPLETE CBC W/AUTO DIFF WBC: CPT

## 2025-04-01 PROCEDURE — 93010 ELECTROCARDIOGRAM REPORT: CPT | Performed by: INTERNAL MEDICINE

## 2025-04-01 PROCEDURE — 84484 ASSAY OF TROPONIN QUANT: CPT

## 2025-04-01 PROCEDURE — 82248 BILIRUBIN DIRECT: CPT

## 2025-04-01 PROCEDURE — 6360000002 HC RX W HCPCS

## 2025-04-01 PROCEDURE — 6370000000 HC RX 637 (ALT 250 FOR IP): Performed by: EMERGENCY MEDICINE

## 2025-04-01 PROCEDURE — 83690 ASSAY OF LIPASE: CPT

## 2025-04-01 PROCEDURE — 93242 EXT ECG>48HR<7D RECORDING: CPT

## 2025-04-01 PROCEDURE — 99285 EMERGENCY DEPT VISIT HI MDM: CPT

## 2025-04-01 PROCEDURE — 36415 COLL VENOUS BLD VENIPUNCTURE: CPT

## 2025-04-01 PROCEDURE — 80053 COMPREHEN METABOLIC PANEL: CPT

## 2025-04-01 PROCEDURE — 93005 ELECTROCARDIOGRAM TRACING: CPT | Performed by: EMERGENCY MEDICINE

## 2025-04-01 PROCEDURE — 71045 X-RAY EXAM CHEST 1 VIEW: CPT

## 2025-04-01 PROCEDURE — 84703 CHORIONIC GONADOTROPIN ASSAY: CPT

## 2025-04-01 RX ORDER — HYDROXYZINE HYDROCHLORIDE 25 MG/1
25 TABLET, FILM COATED ORAL NIGHTLY
Qty: 10 TABLET | Refills: 0 | Status: SHIPPED | OUTPATIENT
Start: 2025-04-01 | End: 2025-04-02 | Stop reason: SINTOL

## 2025-04-01 RX ORDER — HYDROXYZINE HYDROCHLORIDE 50 MG/ML
25 INJECTION, SOLUTION INTRAMUSCULAR ONCE
Status: COMPLETED | OUTPATIENT
Start: 2025-04-01 | End: 2025-04-01

## 2025-04-01 RX ORDER — LORAZEPAM 1 MG/1
1 TABLET ORAL ONCE
Status: COMPLETED | OUTPATIENT
Start: 2025-04-01 | End: 2025-04-01

## 2025-04-01 RX ORDER — LORAZEPAM 2 MG/ML
1 INJECTION INTRAMUSCULAR ONCE
Status: DISCONTINUED | OUTPATIENT
Start: 2025-04-01 | End: 2025-04-01

## 2025-04-01 RX ADMIN — HYDROXYZINE HYDROCHLORIDE 25 MG: 50 INJECTION, SOLUTION INTRAMUSCULAR at 08:41

## 2025-04-01 RX ADMIN — LORAZEPAM 1 MG: 1 TABLET ORAL at 10:47

## 2025-04-01 NOTE — DISCHARGE INSTRUCTIONS
Should follow-up with primary care doctor as referred about for the further workup.  Please avoid high anxiety situation if possible.  If have similar symptoms please present to the nearest ED.

## 2025-04-01 NOTE — ED NOTES
[facility] EMERGENCY DEPARTMENT  EMERGENCY DEPARTMENT ENCOUNTER          Pt Name: Nancy Bruno  MRN: [id number]  Birthdate 1990  Date of evaluation: 4/1/2025  Physician: Gonzalo Oneal DO  Supervising Attending Physician: Martín Combs DO       CHIEF COMPLAINT       Chief Complaint   Patient presents with   • Rapid Response   • Chest Pain   • Dizziness         HISTORY OF PRESENT ILLNESS    HPI  Nancy Bruno is a 34 y.o. female who presents to the emergency department  as a employee at the hospital ,  posterior rapid response called for chest pain .  Patient is a  working at the hospital, reported she was getting ready for the work and sudden onset of chest pressure/pain in the substernal region radiating to the back, 10 out of 10, associated with collarbone pain bilaterally and tingling sensation to the bilateral fingers, lightheadedness without loss of consciousness.  Patient reports similar symptoms in January, was diagnosed with cholecystitis with s/p cholecystectomy.  Patient does have a history of high anxiety in the past, was on on multiple antianxiety medication which was discontinued due to position change.  Patient has not been on anxiety medications since last year.  Patient also reported history of GERD but reports symptoms are different, previously on omeprazole, currently taking Tums with minimal improvement.     Patient denied any neck pain, headache, nausea/vomiting, abdominal pain, urine incontinence, stool incontinence, leg pain.      The patient has no other acute complaints at this time.      REVIEW OF SYSTEMS   Review of Systems      PAST MEDICAL AND SURGICAL HISTORY     Past Medical History:   Diagnosis Date   • Abnormal Pap smear of cervix 2012    LSIL, pt never returned for colp   • Anemia     during and outside of pregnancies   • Anxiety 2011   • Chlamydia 2012   • Depression 2011    was taking medication     Past Surgical History:   Procedure

## 2025-04-01 NOTE — ED NOTES
Patient medicated per MAR at this time. Patient denies further questions. Patient VSS. Respirs are easy and no acute distress noted. Call light cade bonds.

## 2025-04-01 NOTE — ED NOTES
Pt to the ED via staff from a Rapid Response in the hospital. Patient presents with complaints of chest pain and lightheadedness. 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 Patient states that she was seen at [facility] where they wanted to place her on high blood pressure medications but her BP was low. EKG done. Patient is alert and oriented x 4. Respirations are regular and unlabored. Patient provided blanket. Call light within reach.

## 2025-04-01 NOTE — ED PROVIDER NOTES
dissection  GERD       Decision Rules/Clinical Scores utilized:  Not Applicable       Code Status:  Not addressed during this ED visit    Social determinants of health impacting treatment or disposition:  Considered and not applicable     MIPS documentation:  N/A    Medical Decision Making    Patient presented with chest pressure, suspected initially for ACS versus anxiety attack.  Patient ACS workup has been negative including EKG normal sinus rhythm, tropsHS<6.  Patient was given Vistaril IM with improvement in his symptoms and also was given Ativan p.o.x1.    ED COURSE   ED Medications administered this visit (None if left blank):   Medications   hydrOXYzine (VISTARIL) injection 25 mg (25 mg IntraMUSCular Given 4/1/25 0810)   LORazepam (ATIVAN) tablet 1 mg (1 mg Oral Given 4/1/25 1047)                PROCEDURES: (None if blank)  Procedures:     CRITICAL CARE:  None    MEDICATION CHANGES     Current Discharge Medication List        START taking these medications    Details   hydrOXYzine HCl (ATARAX) 25 MG tablet Take 1 tablet by mouth nightly for 10 days  Qty: 10 tablet, Refills: 0               FINAL DISPOSITION   Shared Decision-Making was performed, disposition discussed with the patient/family and questions answered.      Outpatient follow up (If applicable):   [facility]  [address]  [address]  [place] [address]  Go on 4/9/2025  go to appointment tomorrow at 240p    [facility]  [address]  [place] [address] [phone]  Go on 4/3/2025  go appointment as scheduled at 0930a    The results of pertinent diagnostic studies and exam findings were discussed with the patient/surrogate. The patient’s provisional diagnosis and plan of care were discussed with the patient and present family who expressed understanding. Medications were reviewed and indications and risks of medications were discussed with the patient/family present. Strict return precautions

## 2025-04-01 NOTE — ED NOTES
Pt handoff received from KIM Ceja no acute distress noted. Pt resting with eyes closed. Pt responsive to verbal stimuli. Call light in reach at this time. Shoulder pain per patient.

## 2025-04-01 NOTE — ED NOTES
Patient in bed and talking with co-worker at the bedside. Patient expresses her anxiety has improved. Patient VSS and patient does not appear to be in any current distress at this time. Call light within reach.

## 2025-04-02 ENCOUNTER — HOSPITAL ENCOUNTER (OUTPATIENT)
Age: 35
Discharge: HOME | End: 2025-04-02
Payer: COMMERCIAL

## 2025-04-02 ENCOUNTER — OFFICE VISIT (OUTPATIENT)
Dept: FAMILY MEDICINE CLINIC | Age: 35
End: 2025-04-02
Payer: COMMERCIAL

## 2025-04-02 VITALS
OXYGEN SATURATION: 98 % | RESPIRATION RATE: 12 BRPM | SYSTOLIC BLOOD PRESSURE: 100 MMHG | WEIGHT: 136.6 LBS | HEIGHT: 61 IN | DIASTOLIC BLOOD PRESSURE: 60 MMHG | BODY MASS INDEX: 25.79 KG/M2 | HEART RATE: 82 BPM | TEMPERATURE: 98.4 F

## 2025-04-02 DIAGNOSIS — F41.1 GENERALIZED ANXIETY DISORDER WITH PANIC ATTACKS: ICD-10-CM

## 2025-04-02 DIAGNOSIS — R79.89 ELEVATED LIVER FUNCTION TESTS: ICD-10-CM

## 2025-04-02 DIAGNOSIS — R07.89 ATYPICAL CHEST PAIN: Primary | ICD-10-CM

## 2025-04-02 DIAGNOSIS — F41.0 GENERALIZED ANXIETY DISORDER WITH PANIC ATTACKS: ICD-10-CM

## 2025-04-02 DIAGNOSIS — R07.89 ATYPICAL CHEST PAIN: ICD-10-CM

## 2025-04-02 DIAGNOSIS — Z28.39 HEPATITIS A VACCINATION NOT UP TO DATE: ICD-10-CM

## 2025-04-02 DIAGNOSIS — Z28.39 HEPATITIS B VACCINATION NOT UP TO DATE: ICD-10-CM

## 2025-04-02 LAB
ALBUMIN SERPL BCG-MCNC: 4.1 G/DL (ref 3.4–4.9)
ALP SERPL-CCNC: 139 U/L (ref 35–104)
ALT SERPL W/O P-5'-P-CCNC: 297 U/L (ref 10–35)
AST SERPL-CCNC: 157 U/L (ref 10–35)
BILIRUB CONJ SERPL-MCNC: < 0.1 MG/DL (ref 0–0.2)
BILIRUB SERPL-MCNC: < 0.2 MG/DL (ref 0.3–1.2)
HAV IGM SER QL: NONREACTIVE
HBV CORE IGM SERPL QL IA: NONREACTIVE
HBV SURFACE AB TITR SER: 161 MIU/ML
HBV SURFACE AG SERPL QL IA: NONREACTIVE
HCV IGG SERPL QL IA: NONREACTIVE
MAGNESIUM SERPL-MCNC: 2.3 MG/DL (ref 1.6–2.6)
PROT SERPL-MCNC: 7.3 G/DL (ref 6.4–8.3)
TSH SERPL DL<=0.05 MIU/L-ACNC: 1.52 UIU/ML (ref 0.27–4.2)

## 2025-04-02 PROCEDURE — 80074 ACUTE HEPATITIS PANEL: CPT

## 2025-04-02 PROCEDURE — 36415 COLL VENOUS BLD VENIPUNCTURE: CPT

## 2025-04-02 PROCEDURE — 99214 OFFICE O/P EST MOD 30 MIN: CPT

## 2025-04-02 PROCEDURE — 4004F PT TOBACCO SCREEN RCVD TLK: CPT

## 2025-04-02 PROCEDURE — 83735 ASSAY OF MAGNESIUM: CPT

## 2025-04-02 PROCEDURE — G8419 CALC BMI OUT NRM PARAM NOF/U: HCPCS

## 2025-04-02 PROCEDURE — G8427 DOCREV CUR MEDS BY ELIG CLIN: HCPCS

## 2025-04-02 PROCEDURE — 84443 ASSAY THYROID STIM HORMONE: CPT

## 2025-04-02 PROCEDURE — 86706 HEP B SURFACE ANTIBODY: CPT

## 2025-04-02 PROCEDURE — 80076 HEPATIC FUNCTION PANEL: CPT

## 2025-04-02 RX ORDER — ESCITALOPRAM OXALATE 10 MG/1
TABLET ORAL
Qty: 30 TABLET | Refills: 3 | Status: SHIPPED | OUTPATIENT
Start: 2025-04-02

## 2025-04-02 RX ORDER — BUSPIRONE HYDROCHLORIDE 10 MG/1
10 TABLET ORAL 3 TIMES DAILY
Qty: 90 TABLET | Refills: 1 | Status: SHIPPED | OUTPATIENT
Start: 2025-04-02 | End: 2025-06-01

## 2025-04-02 SDOH — ECONOMIC STABILITY: FOOD INSECURITY: WITHIN THE PAST 12 MONTHS, THE FOOD YOU BOUGHT JUST DIDN'T LAST AND YOU DIDN'T HAVE MONEY TO GET MORE.: NEVER TRUE

## 2025-04-02 SDOH — ECONOMIC STABILITY: FOOD INSECURITY: WITHIN THE PAST 12 MONTHS, YOU WORRIED THAT YOUR FOOD WOULD RUN OUT BEFORE YOU GOT MONEY TO BUY MORE.: NEVER TRUE

## 2025-04-02 ASSESSMENT — ANXIETY QUESTIONNAIRES
2. NOT BEING ABLE TO STOP OR CONTROL WORRYING: NEARLY EVERY DAY
4. TROUBLE RELAXING: NEARLY EVERY DAY
3. WORRYING TOO MUCH ABOUT DIFFERENT THINGS: NEARLY EVERY DAY
7. FEELING AFRAID AS IF SOMETHING AWFUL MIGHT HAPPEN: NOT AT ALL
1. FEELING NERVOUS, ANXIOUS, OR ON EDGE: NEARLY EVERY DAY
GAD7 TOTAL SCORE: 17
5. BEING SO RESTLESS THAT IT IS HARD TO SIT STILL: NEARLY EVERY DAY
6. BECOMING EASILY ANNOYED OR IRRITABLE: MORE THAN HALF THE DAYS

## 2025-04-02 ASSESSMENT — PATIENT HEALTH QUESTIONNAIRE - PHQ9
SUM OF ALL RESPONSES TO PHQ QUESTIONS 1-9: 8
9. THOUGHTS THAT YOU WOULD BE BETTER OFF DEAD, OR OF HURTING YOURSELF: NOT AT ALL
10. IF YOU CHECKED OFF ANY PROBLEMS, HOW DIFFICULT HAVE THESE PROBLEMS MADE IT FOR YOU TO DO YOUR WORK, TAKE CARE OF THINGS AT HOME, OR GET ALONG WITH OTHER PEOPLE: SOMEWHAT DIFFICULT
2. FEELING DOWN, DEPRESSED OR HOPELESS: SEVERAL DAYS
7. TROUBLE CONCENTRATING ON THINGS, SUCH AS READING THE NEWSPAPER OR WATCHING TELEVISION: SEVERAL DAYS
1. LITTLE INTEREST OR PLEASURE IN DOING THINGS: NOT AT ALL
5. POOR APPETITE OR OVEREATING: SEVERAL DAYS
6. FEELING BAD ABOUT YOURSELF - OR THAT YOU ARE A FAILURE OR HAVE LET YOURSELF OR YOUR FAMILY DOWN: NOT AT ALL
8. MOVING OR SPEAKING SO SLOWLY THAT OTHER PEOPLE COULD HAVE NOTICED. OR THE OPPOSITE, BEING SO FIGETY OR RESTLESS THAT YOU HAVE BEEN MOVING AROUND A LOT MORE THAN USUAL: NEARLY EVERY DAY
SUM OF ALL RESPONSES TO PHQ QUESTIONS 1-9: 8
4. FEELING TIRED OR HAVING LITTLE ENERGY: SEVERAL DAYS
SUM OF ALL RESPONSES TO PHQ QUESTIONS 1-9: 8
SUM OF ALL RESPONSES TO PHQ QUESTIONS 1-9: 8
3. TROUBLE FALLING OR STAYING ASLEEP: SEVERAL DAYS

## 2025-04-02 ASSESSMENT — ENCOUNTER SYMPTOMS
CONSTIPATION: 0
VOMITING: 0
SHORTNESS OF BREATH: 0
DIARRHEA: 0
CHEST TIGHTNESS: 1
NAUSEA: 0

## 2025-04-02 NOTE — PATIENT INSTRUCTIONS
Avoid alcohol and limit tylenol to 2000mg (4 extra strength tablets) daily while awaiting repeat liver labs. Follow-up in 1 month. Touch base with psychiatrist.    You can complete your labwork at 1 of 4 locations  There are several lab locations near the family medicine clinic.  Imaging services are at the [facility] (see 3rd option below).     1. [facility] stand-alone building with parking out front  [address] ([address] [facility] [address], just across the street from the Emergency Dept / [facility] Parking [address]age)  [place] [address]  P: [phone]     Hours:  Mon - Thurs 6:00AM - 6:00PM  Fri 6 AM to 4 PM  Saturday 6:30AM - Noon        2. [facility] on the second floor of same building where you had your family medicine appointment  [address]   [place] [address]  P: [phone]     Hours:  Mon - Thu 8:30AM-3:00PM  Closed for lunch - 12:00pm - 1:00PM  Fri - Closed     3. [facility]  Can also get x-ray, CT scans, other imaging here  [address], [place] [address]  Tel: [phone]     Hours:  Mon - Fri 6am to 5pm  Saturday - 6:00 am - 12:00 pm *xray,ct, other imaging services not available at this location on weekends.  Sunday - Closed     4. [facility] list of lab and imaging centers:  [email or web address]  *Please call before you go to double check times and whether they can definitely obtain labs for you.    
DISPLAY PLAN FREE TEXT

## 2025-04-02 NOTE — PROGRESS NOTES
Attending Physician Note    I have seen and evaluated the patient. I discussed the findings, assessment and plan with the resident and agree with the resident's findings and plan as documented in the resident's note.  GC modifier added.    Brief summary:  Atypical chest pain - due at least in part to anxiety.  Address as below.  Wearing cardiac monitor.  Has cardiology follow up tomorrow.  Anxiety - trial escitalopram.  Buspirone also.  She will reach out to re-establish with psychiatrist.  Transaminitis - avoid alcohol.  Limit acetaminophen.  Check labs.

## 2025-04-02 NOTE — PROGRESS NOTES
[facility]  [facility]  [address]  [facility] [address]  Dept: [phone]  Dept Fax: [phone]  Loc: [phone]  Resident Note    Patient:Nancy Bruno Sex: female  YOB: 1990 Age:34 y.o.  MRN: [id number]  Assessment & Plan   1. Atypical chest pain  - Labwork and imaging overall reassuring from ED visit. Agree with cardiac monitor for 7 days - patient has already had symptoms while wearing the monitor so it would have captured any cardiac abnormalities at that time  - Differential includes poorly controlled GERD, frequent PVCs, acute hepatitis (due to associated elevated liver enzymes), and anxiety (as patient associates symptoms with situations of feeling overwhelmed)  - Will check Mg and TSH  - Sending Buspirone 10mg TID and Lexapro 10mg daily   Orders  - Magnesium; Future  - TSH reflex to FT4; Future  - busPIRone (BUSPAR) 10 MG tablet; Take 1 tablet by mouth 3 times daily  Dispense: 90 tablet; Refill: 1  - escitalopram (LEXAPRO) 10 MG tablet; Take 1/2 tablet for first 8 days then start full tablet daily  Dispense: 30 tablet; Refill: 3    2. Elevated liver function tests  - Acute issue. 4/1/25 AST//125 (about 2:1 ratio). Patient has had similar elevation (though not to this extent) in January 2024 around the time of her cholecystectomy  - Patient has had recent viral illness but is feeling better now, so will repeat liver enzymes and check acute hepatitis panel (patient does work in healthcare setting as ). Also will check antibodies to confirm prior vaccination for Hep B and Hep A  - Recommended limiting Tylenol intake to 2g daily and no alcohol use during this acute phase  - RTC: 1 month at which time would be able to update vaccines if needed  Orders  - Hepatic Function Panel; Future  - Hepatitis Panel, Acute; Future  - Hepatitis B Surface Antibody; Future  - Hepatitis A Antibody, Total;

## 2025-04-03 ENCOUNTER — RESULTS FOLLOW-UP (OUTPATIENT)
Dept: FAMILY MEDICINE CLINIC | Age: 35
End: 2025-04-03

## 2025-04-03 ENCOUNTER — OFFICE VISIT (OUTPATIENT)
Dept: CARDIOLOGY CLINIC | Age: 35
End: 2025-04-03
Payer: COMMERCIAL

## 2025-04-03 ENCOUNTER — TELEPHONE (OUTPATIENT)
Dept: FAMILY MEDICINE CLINIC | Age: 35
End: 2025-04-03

## 2025-04-03 VITALS
SYSTOLIC BLOOD PRESSURE: 118 MMHG | BODY MASS INDEX: 26.09 KG/M2 | DIASTOLIC BLOOD PRESSURE: 79 MMHG | HEIGHT: 61 IN | WEIGHT: 138.2 LBS | HEART RATE: 69 BPM

## 2025-04-03 DIAGNOSIS — R42 DIZZINESS, NONSPECIFIC: ICD-10-CM

## 2025-04-03 DIAGNOSIS — R74.8 ALKALINE PHOSPHATASE ELEVATION: Primary | ICD-10-CM

## 2025-04-03 DIAGNOSIS — R00.2 HEART PALPITATIONS: ICD-10-CM

## 2025-04-03 DIAGNOSIS — R42 ORTHOSTATIC DIZZINESS: ICD-10-CM

## 2025-04-03 DIAGNOSIS — R07.89 CHEST PAIN, ATYPICAL: Primary | ICD-10-CM

## 2025-04-03 DIAGNOSIS — R79.89 ELEVATED LIVER FUNCTION TESTS: ICD-10-CM

## 2025-04-03 DIAGNOSIS — Z87.898 HISTORY OF SYNCOPE: ICD-10-CM

## 2025-04-03 PROCEDURE — G8427 DOCREV CUR MEDS BY ELIG CLIN: HCPCS | Performed by: INTERNAL MEDICINE

## 2025-04-03 PROCEDURE — 4004F PT TOBACCO SCREEN RCVD TLK: CPT | Performed by: INTERNAL MEDICINE

## 2025-04-03 PROCEDURE — G8419 CALC BMI OUT NRM PARAM NOF/U: HCPCS | Performed by: INTERNAL MEDICINE

## 2025-04-03 PROCEDURE — 99214 OFFICE O/P EST MOD 30 MIN: CPT | Performed by: INTERNAL MEDICINE

## 2025-04-03 NOTE — PROGRESS NOTES
Chief Complaint   Patient presents with    Follow-Up from Hospital    Chest Pain    Originally 23  patient here for check up - orthostatic hypotension  Seen in the ER 2/15/2024 for hypotension and IVF given  and stopped BP med clonidine and sent home from ER  Had Gall bladder  2024 and started clonidine and later stopped .    Post discharged back to her baseline and feel better with dizziness    Had of chronic postural dizziness since elementary age 12    Hx of syncope in elementary, and last one 2 yrs back  It has warning symptom of dizziness            ED follow up for CP.    EKG done 2025.    Hx of  intermittent sharp pain that radiated to the collarbone and to the middle of the back,  last few seconds 5 sec and resolved  Not exertion related   Had similar cp before  Hx of  occasional palpitations and  orthostatic dizziness but much better than before    Denied  sob and KARISHMA     The dizziness and palpitation got much better after better hydration     Used to have dizziness on standing daily, palpitation 2x/ week,     Hx of atypical cp, noncardiac with stress echo negat and getting less frequent and better    Stated can not take full breath otherwise no sob     Hx of  Occasional sharp chest pain, 5/10 , couple yrs and 2x/ weeks , last 2 to 3 min, associated with dizziness and palpitatiobn  Not exertion induced    Denied sob, edema and no recent syncope      Smoke 1/ ppd    FHX   None for CAD    Past Surgical History:   Procedure Laterality Date     SECTION      x3    CHOLECYSTECTOMY, LAPAROSCOPIC N/A 2024    Laparoscopic Cholecystectomy performed by Sobia Gibson MD at [facility]       No Known Allergies     Family History   Problem Relation Age of Onset    Heart Disease Father         Social History     Socioeconomic History    Marital status: Single     Spouse name: Not on file    Number of children: Not on file    Years of education: Not on file    Highest education level: Not on

## 2025-04-03 NOTE — TELEPHONE ENCOUNTER
Jaz, I am Dr. Main, a family medicine resident that is currently covering for Dr. Davies. I reviewed your labs and it did show elevated liver enzymes as previously discussed. Recommend to continue to limit your tylenol to 2g daily as discussed and avoiding alcohol use at that time. Will order liver imaging and will repeat labs to be obtained prior to next visit. Thanks!    If you could please let patient know, thanks!    Electronically signed by Keesha Main MD on 4/3/2025 at 12:29 PM

## 2025-04-11 ENCOUNTER — HOSPITAL ENCOUNTER (OUTPATIENT)
Dept: ULTRASOUND IMAGING | Age: 35
Discharge: HOME | End: 2025-04-11
Payer: COMMERCIAL

## 2025-04-11 DIAGNOSIS — R79.89 ELEVATED LIVER FUNCTION TESTS: ICD-10-CM

## 2025-04-11 PROCEDURE — 76705 ECHO EXAM OF ABDOMEN: CPT

## 2025-04-12 ENCOUNTER — RESULTS FOLLOW-UP (OUTPATIENT)
Dept: FAMILY MEDICINE CLINIC | Age: 35
End: 2025-04-12

## 2025-04-21 ENCOUNTER — HOSPITAL ENCOUNTER (OUTPATIENT)
Dept: CT IMAGING | Age: 35
Discharge: HOME | End: 2025-04-21
Payer: COMMERCIAL

## 2025-04-21 VITALS — SYSTOLIC BLOOD PRESSURE: 95 MMHG | HEART RATE: 57 BPM | OXYGEN SATURATION: 100 % | DIASTOLIC BLOOD PRESSURE: 60 MMHG

## 2025-04-21 DIAGNOSIS — R42 DIZZINESS, NONSPECIFIC: ICD-10-CM

## 2025-04-21 DIAGNOSIS — R07.89 CHEST PAIN, ATYPICAL: ICD-10-CM

## 2025-04-21 DIAGNOSIS — Z87.898 HISTORY OF SYNCOPE: ICD-10-CM

## 2025-04-21 DIAGNOSIS — R42 ORTHOSTATIC DIZZINESS: ICD-10-CM

## 2025-04-21 DIAGNOSIS — R00.2 HEART PALPITATIONS: ICD-10-CM

## 2025-04-21 PROCEDURE — 2500000003 HC RX 250 WO HCPCS: Performed by: RADIOLOGY

## 2025-04-21 PROCEDURE — 75580 N-INVAS EST C FFR SW ALY CTA: CPT

## 2025-04-21 PROCEDURE — 6370000000 HC RX 637 (ALT 250 FOR IP): Performed by: RADIOLOGY

## 2025-04-21 PROCEDURE — 6360000004 HC RX CONTRAST MEDICATION: Performed by: INTERNAL MEDICINE

## 2025-04-21 PROCEDURE — 75574 CT ANGIO HRT W/3D IMAGE: CPT

## 2025-04-21 RX ORDER — NITROGLYCERIN 0.4 MG/1
0.8 TABLET SUBLINGUAL ONCE
Status: COMPLETED | OUTPATIENT
Start: 2025-04-21 | End: 2025-04-21

## 2025-04-21 RX ORDER — METOPROLOL TARTRATE 1 MG/ML
5 INJECTION, SOLUTION INTRAVENOUS EVERY 5 MIN PRN
Status: DISCONTINUED | OUTPATIENT
Start: 2025-04-21 | End: 2025-04-22 | Stop reason: HOSPADM

## 2025-04-21 RX ORDER — SODIUM CHLORIDE 9 MG/ML
INJECTION, SOLUTION INTRAVENOUS CONTINUOUS
OUTPATIENT
Start: 2025-04-21

## 2025-04-21 RX ORDER — IOPAMIDOL 755 MG/ML
80 INJECTION, SOLUTION INTRAVASCULAR
Status: COMPLETED | OUTPATIENT
Start: 2025-04-21 | End: 2025-04-21

## 2025-04-21 RX ADMIN — IOPAMIDOL 80 ML: 755 INJECTION, SOLUTION INTRAVENOUS at 12:26

## 2025-04-21 RX ADMIN — NITROGLYCERIN 0.8 MG: 0.4 TABLET, ORALLY DISINTEGRATING SUBLINGUAL at 12:19

## 2025-04-21 RX ADMIN — METOPROLOL TARTRATE 5 MG: 5 INJECTION INTRAVENOUS at 12:17

## 2025-04-21 ASSESSMENT — PAIN SCALES - GENERAL
PAINLEVEL_OUTOF10: 0
PAINLEVEL_OUTOF10: 0

## 2025-04-21 NOTE — PROGRESS NOTES
1209 Pt arrived to CT for CTA coronary. No family present.   1210 Exam explained using teach back method. Pt states understanding.  1213 Patient assisted to table in supine position. Monitor attached to patient. Comfort ensured.  1220 Exam begins.  1225 Exam complete.   1233 Monitor removed. Patient assisted to seated position. Comfort ensured.  1234 Patient ambulated to discharge lobby in stable condition.

## 2025-04-29 NOTE — ED TRIAGE NOTES
Pt presents to ED for staple/suture removal. Pt states she got staples and sutures a week ago to her head after being bit by a dog.    room air

## 2025-05-07 PROBLEM — F10.20 ALCOHOLISM (HCC): Status: RESOLVED | Noted: 2024-01-26 | Resolved: 2025-05-07

## 2025-07-28 ENCOUNTER — LAB (OUTPATIENT)
Dept: LAB | Age: 35
End: 2025-07-28

## 2025-07-28 ENCOUNTER — OFFICE VISIT (OUTPATIENT)
Dept: FAMILY MEDICINE CLINIC | Age: 35
End: 2025-07-28
Payer: MEDICAID

## 2025-07-28 VITALS
TEMPERATURE: 98.4 F | OXYGEN SATURATION: 98 % | SYSTOLIC BLOOD PRESSURE: 102 MMHG | BODY MASS INDEX: 25.19 KG/M2 | HEART RATE: 87 BPM | HEIGHT: 61 IN | RESPIRATION RATE: 20 BRPM | WEIGHT: 133.4 LBS | DIASTOLIC BLOOD PRESSURE: 68 MMHG

## 2025-07-28 DIAGNOSIS — R12 HEARTBURN: ICD-10-CM

## 2025-07-28 DIAGNOSIS — F41.1 GENERALIZED ANXIETY DISORDER WITH PANIC ATTACKS: Primary | ICD-10-CM

## 2025-07-28 DIAGNOSIS — F43.10 PTSD (POST-TRAUMATIC STRESS DISORDER): ICD-10-CM

## 2025-07-28 DIAGNOSIS — K08.89 TOOTHACHE: ICD-10-CM

## 2025-07-28 DIAGNOSIS — K04.7 DENTAL INFECTION: ICD-10-CM

## 2025-07-28 DIAGNOSIS — R79.89 ELEVATED LFTS: ICD-10-CM

## 2025-07-28 DIAGNOSIS — F41.0 GENERALIZED ANXIETY DISORDER WITH PANIC ATTACKS: Primary | ICD-10-CM

## 2025-07-28 LAB
ALBUMIN SERPL BCG-MCNC: 4.3 G/DL (ref 3.4–4.9)
ALP SERPL-CCNC: 72 U/L (ref 38–126)
ALT SERPL W/O P-5'-P-CCNC: 11 U/L (ref 10–35)
AST SERPL-CCNC: 18 U/L (ref 10–35)
BILIRUB CONJ SERPL-MCNC: 0.3 MG/DL (ref 0–0.2)
BILIRUB SERPL-MCNC: 0.8 MG/DL (ref 0.3–1.2)
FERRITIN SERPL IA-MCNC: 35 NG/ML (ref 13–150)
GGT SERPL-CCNC: 16 U/L (ref 5–36)
PROT SERPL-MCNC: 7.7 G/DL (ref 6.4–8.3)
TRANSFERRIN SERPL-MCNC: 309 MG/DL (ref 200–360)

## 2025-07-28 PROCEDURE — 4004F PT TOBACCO SCREEN RCVD TLK: CPT

## 2025-07-28 PROCEDURE — G8427 DOCREV CUR MEDS BY ELIG CLIN: HCPCS

## 2025-07-28 PROCEDURE — G8419 CALC BMI OUT NRM PARAM NOF/U: HCPCS

## 2025-07-28 PROCEDURE — 99214 OFFICE O/P EST MOD 30 MIN: CPT

## 2025-07-28 RX ORDER — OMEPRAZOLE 20 MG/1
20 CAPSULE, DELAYED RELEASE ORAL
Qty: 90 CAPSULE | Refills: 1 | Status: SHIPPED | OUTPATIENT
Start: 2025-07-28

## 2025-07-28 RX ORDER — ESCITALOPRAM OXALATE 20 MG/1
TABLET ORAL
Qty: 90 TABLET | Refills: 1 | Status: SHIPPED | OUTPATIENT
Start: 2025-07-28

## 2025-07-28 RX ORDER — CHLORHEXIDINE GLUCONATE ORAL RINSE 1.2 MG/ML
15 SOLUTION DENTAL 2 TIMES DAILY
Qty: 420 ML | Refills: 0 | Status: CANCELLED | OUTPATIENT
Start: 2025-07-28 | End: 2025-08-11

## 2025-07-28 NOTE — PROGRESS NOTES
S: 34 y.o. female with   Chief Complaint   Patient presents with    Discuss Medications     Patient states that Buspar is not helping. Patient would also like to discuss starting Omeprazole. Patient also has a tooth ache and would like to discuss antibiotics.       Reviewed hx and ROS in detail with resident prior to exam.  See notes for additional details.     BP Readings from Last 3 Encounters:   07/28/25 102/68   04/21/25 95/60   04/03/25 118/79     Wt Readings from Last 3 Encounters:   07/28/25 60.5 kg (133 lb 6.4 oz)   04/03/25 62.7 kg (138 lb 3.2 oz)   04/02/25 62 kg (136 lb 9.6 oz)           O: VS:  height is 1.549 m (5' 1\") and weight is 60.5 kg (133 lb 6.4 oz). Her oral temperature is 98.4 °F (36.9 °C). Her blood pressure is 102/68 and her pulse is 87. Her respiration is 20 and oxygen saturation is 98%.      Diagnosis Orders   1. Generalized anxiety disorder with panic attacks        2. Toothache        3. Heartburn            Health Maintenance Due   Topic Date Due    Varicella vaccine (1 of 2 - 13+ 2-dose series) Never done    Hepatitis B vaccine (3 of 3 - 3-dose series) 12/30/2004    Pneumococcal 0-49 years Vaccine (1 of 2 - PCV) Never done    Cervical cancer screen  Never done    COVID-19 Vaccine (1 - 2024-25 season) Never done         Attending Physician Statement  I have discussed the case, including pertinent history and exam findings with the resident. I also have seen the patient and performed key portions of the examination.  I agree with the documented assessment and plan as documented by the resident.  GC modifier added to this encounter      Katelyn Ann Leopold, MD 7/28/2025 11:38 AM

## 2025-07-28 NOTE — PROGRESS NOTES
Patient seen as warm handoff by Dr. Davies for anxiety . This provider introduced self, explained role of integrated behavioral health provider. Patient expressed history of diagnosis of PTSD and interest in treatment. Provider responded with reflecting listening, empathy, affirmation.     Patient agreed to schedule follow up appt with this provider in the next week

## 2025-07-28 NOTE — PROGRESS NOTES
[facility] PROFESSIONAL SERVS  [facility]  [address]  [facility] [address]  Dept: [phone]  Dept Fax: [phone]  Loc: [phone]  Resident Note    Patient:Nancy Bruno Sex: female  YOB: 1990 Age:34 y.o.  MRN: [id number]  Assessment & Plan   1. Generalized anxiety disorder with panic attacks  2. PTSD (post-traumatic stress disorder)  - Chronic, was doing okay on Lexapro but not completely at goal - has been out for a while. Will increase dose to 20mg daily however titrate up by taking 10mg for the first week then increase to the full 20mg dose if tolerating well  - Referring to psychiatry for management of PTSD per patient request (she notes she was on Risperidone previously). Reassured patient that SSRIs are first-line treatment for PTSD  - Referring to Dr. Avendano for counseling  Orders  - escitalopram (LEXAPRO) 20 MG tablet; Take 1/2 tablet for the first 8 days then resume full tablet  Dispense: 90 tablet; Refill: 1  - Aranza Patricia, CNP, Psychiatry, [place]  - Finesse Maharaj, PhD, Psychology, [place]    3. Toothache  - Acute issue of the last couple days. Exam significant for three broken molars in the left lower jaw with surrounding inflammation and swelling of the jaw but no tenderness on percussion of the mastoid process  - Treating with Augmentin twice daily for 7 days and Magic Mouthwash for pain. Advised patient to f/u with dentist regarding having the remains of those three molars removed  Orders  - amoxicillin-clavulanate (AUGMENTIN) 875-125 MG per tablet; Take 1 tablet by mouth 2 times daily for 7 days  Dispense: 14 tablet; Refill: 0  - Magic Mouthwash (MIRACLE MOUTHWASH); Swish and spit 5 mLs 4 times daily as needed for Irritation  Dispense: 50 mL; Refill: 0    4. Heartburn  5. Dental infection  - Patient concerned about jerking (like a hiccup but without audible sound) which has worsened recently. Also has

## 2025-07-28 NOTE — PATIENT INSTRUCTIONS
Please restart Lexapro by taking 1/2 tablet for the first 8 days then increase to full tablet (dose will be 20mg daily at that time).     Start Omeprazole 20mg in the AM for heartburn. Can be helpful to also limit irritating foods like acidic foods, carbohydrated drinks, coffee, chocolate, alcohol, and tobacco smoke.    Please get labwork done today on the second floor (should be hepatic function panel. GGT, Ferritin, and Transferrin).    Schedule with psychiatry (referred to Aranza Barrientos) and psychology (referred to Dr. Avendano) when able.     We will treat tooth infection with Augmentin to take twice daily for 7 days and Magic Mouthwash to help with pain (can take 4 times daily as swish and spit). Please touch base with dentist and insurance regarding having tooth removed.

## 2025-07-31 ASSESSMENT — ENCOUNTER SYMPTOMS
CONSTIPATION: 0
NAUSEA: 0
ABDOMINAL PAIN: 0
DIARRHEA: 0
VOMITING: 0

## 2025-08-04 ENCOUNTER — OFFICE VISIT (OUTPATIENT)
Age: 35
End: 2025-08-04
Payer: MEDICAID

## 2025-08-04 DIAGNOSIS — F41.1 GENERALIZED ANXIETY DISORDER WITH PANIC ATTACKS: Primary | ICD-10-CM

## 2025-08-04 DIAGNOSIS — F32.A DEPRESSION, UNSPECIFIED DEPRESSION TYPE: ICD-10-CM

## 2025-08-04 DIAGNOSIS — F41.0 GENERALIZED ANXIETY DISORDER WITH PANIC ATTACKS: Primary | ICD-10-CM

## 2025-08-04 PROCEDURE — 90791 PSYCH DIAGNOSTIC EVALUATION: CPT | Performed by: PSYCHOLOGIST

## 2025-08-04 PROCEDURE — 4004F PT TOBACCO SCREEN RCVD TLK: CPT | Performed by: PSYCHOLOGIST

## 2025-08-04 ASSESSMENT — PATIENT HEALTH QUESTIONNAIRE - PHQ9
10. IF YOU CHECKED OFF ANY PROBLEMS, HOW DIFFICULT HAVE THESE PROBLEMS MADE IT FOR YOU TO DO YOUR WORK, TAKE CARE OF THINGS AT HOME, OR GET ALONG WITH OTHER PEOPLE: VERY DIFFICULT
1. LITTLE INTEREST OR PLEASURE IN DOING THINGS: SEVERAL DAYS
7. TROUBLE CONCENTRATING ON THINGS, SUCH AS READING THE NEWSPAPER OR WATCHING TELEVISION: NEARLY EVERY DAY
SUM OF ALL RESPONSES TO PHQ QUESTIONS 1-9: 15
4. FEELING TIRED OR HAVING LITTLE ENERGY: MORE THAN HALF THE DAYS
SUM OF ALL RESPONSES TO PHQ QUESTIONS 1-9: 15
8. MOVING OR SPEAKING SO SLOWLY THAT OTHER PEOPLE COULD HAVE NOTICED. OR THE OPPOSITE, BEING SO FIGETY OR RESTLESS THAT YOU HAVE BEEN MOVING AROUND A LOT MORE THAN USUAL: NEARLY EVERY DAY
5. POOR APPETITE OR OVEREATING: MORE THAN HALF THE DAYS
9. THOUGHTS THAT YOU WOULD BE BETTER OFF DEAD, OR OF HURTING YOURSELF: NOT AT ALL
SUM OF ALL RESPONSES TO PHQ QUESTIONS 1-9: 15
3. TROUBLE FALLING OR STAYING ASLEEP: NEARLY EVERY DAY
2. FEELING DOWN, DEPRESSED OR HOPELESS: SEVERAL DAYS
6. FEELING BAD ABOUT YOURSELF - OR THAT YOU ARE A FAILURE OR HAVE LET YOURSELF OR YOUR FAMILY DOWN: NOT AT ALL
SUM OF ALL RESPONSES TO PHQ QUESTIONS 1-9: 15

## 2025-08-04 ASSESSMENT — ANXIETY QUESTIONNAIRES
4. TROUBLE RELAXING: MORE THAN HALF THE DAYS
5. BEING SO RESTLESS THAT IT IS HARD TO SIT STILL: MORE THAN HALF THE DAYS
IF YOU CHECKED OFF ANY PROBLEMS ON THIS QUESTIONNAIRE, HOW DIFFICULT HAVE THESE PROBLEMS MADE IT FOR YOU TO DO YOUR WORK, TAKE CARE OF THINGS AT HOME, OR GET ALONG WITH OTHER PEOPLE: SOMEWHAT DIFFICULT
7. FEELING AFRAID AS IF SOMETHING AWFUL MIGHT HAPPEN: SEVERAL DAYS
5. BEING SO RESTLESS THAT IT IS HARD TO SIT STILL: MORE THAN HALF THE DAYS
3. WORRYING TOO MUCH ABOUT DIFFERENT THINGS: SEVERAL DAYS
2. NOT BEING ABLE TO STOP OR CONTROL WORRYING: SEVERAL DAYS
3. WORRYING TOO MUCH ABOUT DIFFERENT THINGS: SEVERAL DAYS
1. FEELING NERVOUS, ANXIOUS, OR ON EDGE: SEVERAL DAYS
1. FEELING NERVOUS, ANXIOUS, OR ON EDGE: SEVERAL DAYS
6. BECOMING EASILY ANNOYED OR IRRITABLE: MORE THAN HALF THE DAYS
4. TROUBLE RELAXING: MORE THAN HALF THE DAYS
2. NOT BEING ABLE TO STOP OR CONTROL WORRYING: SEVERAL DAYS
7. FEELING AFRAID AS IF SOMETHING AWFUL MIGHT HAPPEN: SEVERAL DAYS
6. BECOMING EASILY ANNOYED OR IRRITABLE: MORE THAN HALF THE DAYS
GAD7 TOTAL SCORE: 10
IF YOU CHECKED OFF ANY PROBLEMS ON THIS QUESTIONNAIRE, HOW DIFFICULT HAVE THESE PROBLEMS MADE IT FOR YOU TO DO YOUR WORK, TAKE CARE OF THINGS AT HOME, OR GET ALONG WITH OTHER PEOPLE: SOMEWHAT DIFFICULT

## 2025-08-28 ENCOUNTER — OFFICE VISIT (OUTPATIENT)
Age: 35
End: 2025-08-28
Payer: MEDICAID

## 2025-08-28 DIAGNOSIS — F32.A DEPRESSION, UNSPECIFIED DEPRESSION TYPE: ICD-10-CM

## 2025-08-28 DIAGNOSIS — F41.1 GENERALIZED ANXIETY DISORDER WITH PANIC ATTACKS: Primary | ICD-10-CM

## 2025-08-28 DIAGNOSIS — F41.0 GENERALIZED ANXIETY DISORDER WITH PANIC ATTACKS: Primary | ICD-10-CM

## 2025-08-28 PROCEDURE — 90832 PSYTX W PT 30 MINUTES: CPT | Performed by: PSYCHOLOGIST

## 2025-08-28 PROCEDURE — 4004F PT TOBACCO SCREEN RCVD TLK: CPT | Performed by: PSYCHOLOGIST

## 2025-08-28 ASSESSMENT — PATIENT HEALTH QUESTIONNAIRE - PHQ9
9. THOUGHTS THAT YOU WOULD BE BETTER OFF DEAD, OR OF HURTING YOURSELF: NOT AT ALL
SUM OF ALL RESPONSES TO PHQ QUESTIONS 1-9: 9
1. LITTLE INTEREST OR PLEASURE IN DOING THINGS: SEVERAL DAYS
7. TROUBLE CONCENTRATING ON THINGS, SUCH AS READING THE NEWSPAPER OR WATCHING TELEVISION: MORE THAN HALF THE DAYS
6. FEELING BAD ABOUT YOURSELF - OR THAT YOU ARE A FAILURE OR HAVE LET YOURSELF OR YOUR FAMILY DOWN: NOT AT ALL
3. TROUBLE FALLING OR STAYING ASLEEP: SEVERAL DAYS
5. POOR APPETITE OR OVEREATING: SEVERAL DAYS
10. IF YOU CHECKED OFF ANY PROBLEMS, HOW DIFFICULT HAVE THESE PROBLEMS MADE IT FOR YOU TO DO YOUR WORK, TAKE CARE OF THINGS AT HOME, OR GET ALONG WITH OTHER PEOPLE: SOMEWHAT DIFFICULT
SUM OF ALL RESPONSES TO PHQ QUESTIONS 1-9: 9
8. MOVING OR SPEAKING SO SLOWLY THAT OTHER PEOPLE COULD HAVE NOTICED. OR THE OPPOSITE, BEING SO FIGETY OR RESTLESS THAT YOU HAVE BEEN MOVING AROUND A LOT MORE THAN USUAL: NEARLY EVERY DAY
4. FEELING TIRED OR HAVING LITTLE ENERGY: SEVERAL DAYS
2. FEELING DOWN, DEPRESSED OR HOPELESS: NOT AT ALL

## (undated) DEVICE — DRAIN SURG 19FR 0.25IN SIL RND W/ TRCR INDIC DOT RADPQ FULL

## (undated) DEVICE — GENERAL LAPAROSCOPY-LF: Brand: MEDLINE INDUSTRIES, INC.

## (undated) DEVICE — GLOVE ORANGE PI 7   MSG9070

## (undated) DEVICE — PUMP SUC IRR TBNG L10FT W/ HNDPC ASSEMB STRYKEFLOW 2

## (undated) DEVICE — EVACUATOR SURG 100CC SIL BLB SUCT RESVR FOR CLS WND DRNGE

## (undated) DEVICE — BAG SPEC REM 224ML W4XL6IN DIA10MM 1 HND GYN DISP ENDOPCH

## (undated) DEVICE — INSUFFLATION NEEDLE TO ESTABLISH PNEUMOPERITONEUM.: Brand: INSUFFLATION NEEDLE

## (undated) DEVICE — TUBING INSUFFLATION SMK EVAC HI FLO SET PNEUMOCLEAR

## (undated) DEVICE — UNIVERSAL MONOPOLAR LAPAROSCOPIC CABLE 10FT, 4MM PIN CONNECTOR: Brand: CONMED

## (undated) DEVICE — APPLIER CLP M L L11.4IN DIA10MM ENDOSCP ROT MULT FOR LIG

## (undated) DEVICE — CONMED GOLDLINE ELECTROSURGICAL HANDPIECE, HAND CONTROLLED WITH ULTRACLEAN BLADE ELECTRODE, BUTTON SWITCH, SAFETY HOLSTER AND 15' (4.6 M) CABLE: Brand: CONMED GOLDLINE

## (undated) DEVICE — TROCAR: Brand: KII FIOS FIRST ENTRY

## (undated) DEVICE — SUTURE VCRL + SZ 0 L27IN ABSRB VLT L26MM UR-6 5/8 CIR VCP603H

## (undated) DEVICE — AGENT HEMSTAT W2XL4IN FIBRIN SEAL PTCH DSG EVARREST

## (undated) DEVICE — TROCAR: Brand: KII® SLEEVE

## (undated) DEVICE — LIQUIBAND RAPID ADHESIVE 36/CS 0.8ML: Brand: MEDLINE